# Patient Record
Sex: FEMALE | Race: WHITE | HISPANIC OR LATINO | Employment: FULL TIME | URBAN - METROPOLITAN AREA
[De-identification: names, ages, dates, MRNs, and addresses within clinical notes are randomized per-mention and may not be internally consistent; named-entity substitution may affect disease eponyms.]

---

## 2019-03-13 LAB
ABO GROUP BLD: NORMAL
RH BLD: POSITIVE
RUBV IGG SERPL IA-ACNC: 1.62

## 2019-08-12 ENCOUNTER — HOSPITAL ENCOUNTER (OUTPATIENT)
Dept: LAB | Facility: MEDICAL CENTER | Age: 24
End: 2019-08-12
Attending: OBSTETRICS & GYNECOLOGY
Payer: COMMERCIAL

## 2019-08-12 PROCEDURE — 87081 CULTURE SCREEN ONLY: CPT

## 2019-08-12 PROCEDURE — 87150 DNA/RNA AMPLIFIED PROBE: CPT

## 2019-08-14 LAB — GP B STREP DNA SPEC QL NAA+PROBE: NEGATIVE

## 2019-08-17 ENCOUNTER — HOSPITAL ENCOUNTER (OUTPATIENT)
Facility: MEDICAL CENTER | Age: 24
End: 2019-08-17
Attending: OBSTETRICS & GYNECOLOGY | Admitting: OBSTETRICS & GYNECOLOGY
Payer: COMMERCIAL

## 2019-08-17 VITALS
SYSTOLIC BLOOD PRESSURE: 121 MMHG | HEART RATE: 80 BPM | DIASTOLIC BLOOD PRESSURE: 77 MMHG | RESPIRATION RATE: 18 BRPM | WEIGHT: 240 LBS | TEMPERATURE: 97.8 F | BODY MASS INDEX: 40.97 KG/M2 | HEIGHT: 64 IN

## 2019-08-17 LAB — CRYSTALS AMN MICRO: NORMAL

## 2019-08-17 PROCEDURE — 89060 EXAM SYNOVIAL FLUID CRYSTALS: CPT

## 2019-08-17 PROCEDURE — 59025 FETAL NON-STRESS TEST: CPT

## 2019-08-17 NOTE — PROGRESS NOTES
NST Review    Alley Hartman is a 24 yo  who presented at 36w2d with complaints of leaking. Denied contractions, bleeding or LOF. Positive FM. Vital signs within normal limits. NST was performed and reviewed by myself with baseline of 130's, moderate variability and accelerations present. Tocometer quiescent. SSE without pooling. Ferning negative. SVE /-2. Stable for discharge home and follow up with Dr Dominguez as previously scheduled.     Loni Sifuentes M.D.

## 2019-08-17 NOTE — PROGRESS NOTES
"0435- pt is a , 36.2 weeks gestation IUP, with c/o possible SROM around 0115 after waking up in a \"puddle of fluid\", with no other leaking since, and back pain. No c/o bleeding or dfm. efm and toco placed, vss.   0455- sterile vaginal speculum exam performed, no pooling noted, fern gathered. sve performed, /2.   555- Dr. Sifuentes at nurses station, updated c/o and negative fern, received orders for discharge home on  labor precautions.  615- discussed poc with pt, monitors removed, left to change clothes.  624- addressed discharge instruction with  labor precautions, all questions answered. Left off floor with SO at side.   "

## 2019-09-11 ENCOUNTER — ANESTHESIA EVENT (OUTPATIENT)
Dept: ANESTHESIOLOGY | Facility: MEDICAL CENTER | Age: 24
End: 2019-09-11
Payer: COMMERCIAL

## 2019-09-11 ENCOUNTER — HOSPITAL ENCOUNTER (INPATIENT)
Facility: MEDICAL CENTER | Age: 24
LOS: 3 days | End: 2019-09-14
Attending: OBSTETRICS & GYNECOLOGY | Admitting: OBSTETRICS & GYNECOLOGY
Payer: COMMERCIAL

## 2019-09-11 ENCOUNTER — ANESTHESIA (OUTPATIENT)
Dept: ANESTHESIOLOGY | Facility: MEDICAL CENTER | Age: 24
End: 2019-09-11
Payer: COMMERCIAL

## 2019-09-11 ENCOUNTER — APPOINTMENT (OUTPATIENT)
Dept: OBGYN | Facility: MEDICAL CENTER | Age: 24
End: 2019-09-11
Attending: OBSTETRICS & GYNECOLOGY
Payer: COMMERCIAL

## 2019-09-11 LAB
ALBUMIN SERPL BCP-MCNC: 3.5 G/DL (ref 3.2–4.9)
ALBUMIN/GLOB SERPL: 1.3 G/DL
ALP SERPL-CCNC: 158 U/L (ref 30–99)
ALT SERPL-CCNC: 10 U/L (ref 2–50)
ANION GAP SERPL CALC-SCNC: 11 MMOL/L (ref 0–11.9)
AST SERPL-CCNC: 15 U/L (ref 12–45)
BASOPHILS # BLD AUTO: 0.1 % (ref 0–1.8)
BASOPHILS # BLD: 0.01 K/UL (ref 0–0.12)
BILIRUB SERPL-MCNC: 0.8 MG/DL (ref 0.1–1.5)
BUN SERPL-MCNC: 9 MG/DL (ref 8–22)
CALCIUM SERPL-MCNC: 9.3 MG/DL (ref 8.5–10.5)
CHLORIDE SERPL-SCNC: 104 MMOL/L (ref 96–112)
CO2 SERPL-SCNC: 21 MMOL/L (ref 20–33)
CREAT SERPL-MCNC: 0.56 MG/DL (ref 0.5–1.4)
EOSINOPHIL # BLD AUTO: 0.06 K/UL (ref 0–0.51)
EOSINOPHIL NFR BLD: 0.8 % (ref 0–6.9)
ERYTHROCYTE [DISTWIDTH] IN BLOOD BY AUTOMATED COUNT: 45.6 FL (ref 35.9–50)
GLOBULIN SER CALC-MCNC: 2.8 G/DL (ref 1.9–3.5)
GLUCOSE SERPL-MCNC: 110 MG/DL (ref 65–99)
HCT VFR BLD AUTO: 40.1 % (ref 37–47)
HGB BLD-MCNC: 13.8 G/DL (ref 12–16)
HOLDING TUBE BB 8507: NORMAL
IMM GRANULOCYTES # BLD AUTO: 0.03 K/UL (ref 0–0.11)
IMM GRANULOCYTES NFR BLD AUTO: 0.4 % (ref 0–0.9)
LYMPHOCYTES # BLD AUTO: 1.55 K/UL (ref 1–4.8)
LYMPHOCYTES NFR BLD: 19.7 % (ref 22–41)
MCH RBC QN AUTO: 32 PG (ref 27–33)
MCHC RBC AUTO-ENTMCNC: 34.4 G/DL (ref 33.6–35)
MCV RBC AUTO: 93 FL (ref 81.4–97.8)
MONOCYTES # BLD AUTO: 0.37 K/UL (ref 0–0.85)
MONOCYTES NFR BLD AUTO: 4.7 % (ref 0–13.4)
NEUTROPHILS # BLD AUTO: 5.86 K/UL (ref 2–7.15)
NEUTROPHILS NFR BLD: 74.3 % (ref 44–72)
NRBC # BLD AUTO: 0 K/UL
NRBC BLD-RTO: 0 /100 WBC
PLATELET # BLD AUTO: 186 K/UL (ref 164–446)
PMV BLD AUTO: 12.3 FL (ref 9–12.9)
POTASSIUM SERPL-SCNC: 3.7 MMOL/L (ref 3.6–5.5)
PROT SERPL-MCNC: 6.3 G/DL (ref 6–8.2)
RBC # BLD AUTO: 4.31 M/UL (ref 4.2–5.4)
SODIUM SERPL-SCNC: 136 MMOL/L (ref 135–145)
URATE SERPL-MCNC: 5.4 MG/DL (ref 1.9–8.2)
WBC # BLD AUTO: 7.9 K/UL (ref 4.8–10.8)

## 2019-09-11 PROCEDURE — 85025 COMPLETE CBC W/AUTO DIFF WBC: CPT

## 2019-09-11 PROCEDURE — 10H07YZ INSERTION OF OTHER DEVICE INTO PRODUCTS OF CONCEPTION, VIA NATURAL OR ARTIFICIAL OPENING: ICD-10-PCS | Performed by: OBSTETRICS & GYNECOLOGY

## 2019-09-11 PROCEDURE — 3E033VJ INTRODUCTION OF OTHER HORMONE INTO PERIPHERAL VEIN, PERCUTANEOUS APPROACH: ICD-10-PCS | Performed by: OBSTETRICS & GYNECOLOGY

## 2019-09-11 PROCEDURE — 700105 HCHG RX REV CODE 258

## 2019-09-11 PROCEDURE — 770002 HCHG ROOM/CARE - OB PRIVATE (112)

## 2019-09-11 PROCEDURE — 36415 COLL VENOUS BLD VENIPUNCTURE: CPT

## 2019-09-11 PROCEDURE — 700105 HCHG RX REV CODE 258: Performed by: OBSTETRICS & GYNECOLOGY

## 2019-09-11 PROCEDURE — 700111 HCHG RX REV CODE 636 W/ 250 OVERRIDE (IP)

## 2019-09-11 PROCEDURE — 700111 HCHG RX REV CODE 636 W/ 250 OVERRIDE (IP): Performed by: OBSTETRICS & GYNECOLOGY

## 2019-09-11 PROCEDURE — 700111 HCHG RX REV CODE 636 W/ 250 OVERRIDE (IP): Performed by: ANESTHESIOLOGY

## 2019-09-11 PROCEDURE — 80053 COMPREHEN METABOLIC PANEL: CPT

## 2019-09-11 PROCEDURE — 10907ZC DRAINAGE OF AMNIOTIC FLUID, THERAPEUTIC FROM PRODUCTS OF CONCEPTION, VIA NATURAL OR ARTIFICIAL OPENING: ICD-10-PCS | Performed by: OBSTETRICS & GYNECOLOGY

## 2019-09-11 PROCEDURE — 84550 ASSAY OF BLOOD/URIC ACID: CPT

## 2019-09-11 RX ORDER — BUPIVACAINE HYDROCHLORIDE 2.5 MG/ML
INJECTION, SOLUTION EPIDURAL; INFILTRATION; INTRACAUDAL
Status: COMPLETED
Start: 2019-09-11 | End: 2019-09-11

## 2019-09-11 RX ORDER — BUPIVACAINE HYDROCHLORIDE 2.5 MG/ML
INJECTION, SOLUTION EPIDURAL; INFILTRATION; INTRACAUDAL PRN
Status: DISCONTINUED | OUTPATIENT
Start: 2019-09-11 | End: 2019-09-12 | Stop reason: SURG

## 2019-09-11 RX ORDER — SODIUM CHLORIDE, SODIUM LACTATE, POTASSIUM CHLORIDE, CALCIUM CHLORIDE 600; 310; 30; 20 MG/100ML; MG/100ML; MG/100ML; MG/100ML
INJECTION, SOLUTION INTRAVENOUS
Status: COMPLETED
Start: 2019-09-11 | End: 2019-09-11

## 2019-09-11 RX ORDER — SODIUM CHLORIDE, SODIUM LACTATE, POTASSIUM CHLORIDE, CALCIUM CHLORIDE 600; 310; 30; 20 MG/100ML; MG/100ML; MG/100ML; MG/100ML
INJECTION, SOLUTION INTRAVENOUS CONTINUOUS
Status: DISPENSED | OUTPATIENT
Start: 2019-09-11 | End: 2019-09-11

## 2019-09-11 RX ORDER — ROPIVACAINE HYDROCHLORIDE 2 MG/ML
INJECTION, SOLUTION EPIDURAL; INFILTRATION; PERINEURAL
Status: COMPLETED
Start: 2019-09-11 | End: 2019-09-11

## 2019-09-11 RX ORDER — DEXTROSE, SODIUM CHLORIDE, SODIUM LACTATE, POTASSIUM CHLORIDE, AND CALCIUM CHLORIDE 5; .6; .31; .03; .02 G/100ML; G/100ML; G/100ML; G/100ML; G/100ML
INJECTION, SOLUTION INTRAVENOUS CONTINUOUS
Status: DISCONTINUED | OUTPATIENT
Start: 2019-09-11 | End: 2019-09-14 | Stop reason: HOSPADM

## 2019-09-11 RX ORDER — ROPIVACAINE HYDROCHLORIDE 2 MG/ML
INJECTION, SOLUTION EPIDURAL; INFILTRATION
Status: DISCONTINUED | OUTPATIENT
Start: 2019-09-11 | End: 2019-09-12 | Stop reason: SURG

## 2019-09-11 RX ORDER — MISOPROSTOL 200 UG/1
800 TABLET ORAL
Status: DISCONTINUED | OUTPATIENT
Start: 2019-09-11 | End: 2019-09-12 | Stop reason: HOSPADM

## 2019-09-11 RX ADMIN — ROPIVACAINE HYDROCHLORIDE 10 ML/HR: 2 INJECTION, SOLUTION EPIDURAL; INFILTRATION at 21:34

## 2019-09-11 RX ADMIN — ROPIVACAINE HYDROCHLORIDE 10 ML: 2 INJECTION, SOLUTION EPIDURAL; INFILTRATION at 21:56

## 2019-09-11 RX ADMIN — SODIUM CHLORIDE, POTASSIUM CHLORIDE, SODIUM LACTATE AND CALCIUM CHLORIDE 1000 ML: 600; 310; 30; 20 INJECTION, SOLUTION INTRAVENOUS at 13:00

## 2019-09-11 RX ADMIN — FENTANYL CITRATE 100 MCG: 50 INJECTION, SOLUTION INTRAMUSCULAR; INTRAVENOUS at 21:34

## 2019-09-11 RX ADMIN — BUPIVACAINE HYDROCHLORIDE 10 ML: 2.5 INJECTION, SOLUTION EPIDURAL; INFILTRATION; INTRACAUDAL; PERINEURAL at 21:34

## 2019-09-11 RX ADMIN — SODIUM CHLORIDE, POTASSIUM CHLORIDE, SODIUM LACTATE AND CALCIUM CHLORIDE: 600; 310; 30; 20 INJECTION, SOLUTION INTRAVENOUS at 22:59

## 2019-09-11 RX ADMIN — SODIUM CHLORIDE, POTASSIUM CHLORIDE, SODIUM LACTATE AND CALCIUM CHLORIDE: 600; 310; 30; 20 INJECTION, SOLUTION INTRAVENOUS at 22:12

## 2019-09-11 RX ADMIN — Medication 1 MILLI-UNITS/MIN: at 14:47

## 2019-09-11 ASSESSMENT — LIFESTYLE VARIABLES
EVER_SMOKED: NEVER
HAVE PEOPLE ANNOYED YOU BY CRITICIZING YOUR DRINKING: NO
ON A TYPICAL DAY WHEN YOU DRINK ALCOHOL HOW MANY DRINKS DO YOU HAVE: 0
DOES PATIENT WANT TO STOP DRINKING: NO
EVER HAD A DRINK FIRST THING IN THE MORNING TO STEADY YOUR NERVES TO GET RID OF A HANGOVER: NO
TOTAL SCORE: 0
TOTAL SCORE: 0
ALCOHOL_USE: NO
AVERAGE NUMBER OF DAYS PER WEEK YOU HAVE A DRINK CONTAINING ALCOHOL: 0
EVER FELT BAD OR GUILTY ABOUT YOUR DRINKING: NO
HAVE YOU EVER FELT YOU SHOULD CUT DOWN ON YOUR DRINKING: NO
CONSUMPTION TOTAL: NEGATIVE
TOTAL SCORE: 0
EVER_SMOKED: NEVER
HOW MANY TIMES IN THE PAST YEAR HAVE YOU HAD 5 OR MORE DRINKS IN A DAY: 0

## 2019-09-11 ASSESSMENT — PATIENT HEALTH QUESTIONNAIRE - PHQ9
SUM OF ALL RESPONSES TO PHQ9 QUESTIONS 1 AND 2: 0
SUM OF ALL RESPONSES TO PHQ9 QUESTIONS 1 AND 2: 0
1. LITTLE INTEREST OR PLEASURE IN DOING THINGS: NOT AT ALL
2. FEELING DOWN, DEPRESSED, IRRITABLE, OR HOPELESS: NOT AT ALL
1. LITTLE INTEREST OR PLEASURE IN DOING THINGS: NOT AT ALL
2. FEELING DOWN, DEPRESSED, IRRITABLE, OR HOPELESS: NOT AT ALL

## 2019-09-11 NOTE — H&P
DATE OF ADMISSION:  2019    CHIEF COMPLAINT:  Induction of labor secondary to elevated blood pressures in   our office.    HISTORY OF PRESENT ILLNESS:  The patient is a 24-year-old female  1,   para 0 at 39-6/7 weeks' gestational age.  She presented for an induction of   labor this morning secondary to having a blood pressure of 144/88 in the   office yesterday with 2+ protein.  Her pregnancy has thus far been   uncomplicated.    PAST MEDICAL HISTORY:  Negative.    PAST SURGICAL HISTORY:  Negative.    SOCIAL HISTORY:  Negative.    ALLERGIES:  Patient has no known drug medication allergies.      CURRENT MEDICATIONS:  Include a prenatal vitamin.    LABORATORY DATA:  Show blood type of O positive, rubella immune.  Her group B   strep culture is negative.    PHYSICAL EXAMINATION:  VITAL SIGNS:  Her vital signs thus far have been stable at admission, with   first of 112/59.  CARDIOVASCULAR:  Regular rate and rhythm.  LUNGS:  Clear.  VAGINAL EXAM:  3, 80 and -2.  Fetal heart tones are 120s, category 1 tracing   and tocometry is irregular at this point.    LABORATORY DATA:  Have been obtained, but are not back yet.    ASSESSMENT AND PLAN:  This is a 24-year-old female  1, para 0 at 39-6/7   weeks' gestational age.  She presents for induction of labor secondary to an   elevated blood pressure in the office.  She has been started on Pitocin.  She   can have fentanyl or an epidural as needed.       ____________________________________     Corinne E. Capurro, MD [facility] / [facility]    DD:  2019 15:39:59  DT:  2019 15:54:05    D#:  [id number]  Job#:  [id number]

## 2019-09-12 LAB
ERYTHROCYTE [DISTWIDTH] IN BLOOD BY AUTOMATED COUNT: 46.6 FL (ref 35.9–50)
HCT VFR BLD AUTO: 38.1 % (ref 37–47)
HGB BLD-MCNC: 12.9 G/DL (ref 12–16)
MCH RBC QN AUTO: 31.6 PG (ref 27–33)
MCHC RBC AUTO-ENTMCNC: 33.9 G/DL (ref 33.6–35)
MCV RBC AUTO: 93.4 FL (ref 81.4–97.8)
PLATELET # BLD AUTO: 163 K/UL (ref 164–446)
PMV BLD AUTO: 11.8 FL (ref 9–12.9)
RBC # BLD AUTO: 4.08 M/UL (ref 4.2–5.4)
WBC # BLD AUTO: 16 K/UL (ref 4.8–10.8)

## 2019-09-12 PROCEDURE — 304965 HCHG RECOVERY SERVICES

## 2019-09-12 PROCEDURE — A9270 NON-COVERED ITEM OR SERVICE: HCPCS | Performed by: OBSTETRICS & GYNECOLOGY

## 2019-09-12 PROCEDURE — 700111 HCHG RX REV CODE 636 W/ 250 OVERRIDE (IP)

## 2019-09-12 PROCEDURE — 700102 HCHG RX REV CODE 250 W/ 637 OVERRIDE(OP): Performed by: OBSTETRICS & GYNECOLOGY

## 2019-09-12 PROCEDURE — 3E02340 INTRODUCTION OF INFLUENZA VACCINE INTO MUSCLE, PERCUTANEOUS APPROACH: ICD-10-PCS | Performed by: OBSTETRICS & GYNECOLOGY

## 2019-09-12 PROCEDURE — 700111 HCHG RX REV CODE 636 W/ 250 OVERRIDE (IP): Performed by: OBSTETRICS & GYNECOLOGY

## 2019-09-12 PROCEDURE — 85027 COMPLETE CBC AUTOMATED: CPT

## 2019-09-12 PROCEDURE — 36415 COLL VENOUS BLD VENIPUNCTURE: CPT

## 2019-09-12 PROCEDURE — 59409 OBSTETRICAL CARE: CPT

## 2019-09-12 PROCEDURE — 770002 HCHG ROOM/CARE - OB PRIVATE (112)

## 2019-09-12 PROCEDURE — 90686 IIV4 VACC NO PRSV 0.5 ML IM: CPT | Performed by: OBSTETRICS & GYNECOLOGY

## 2019-09-12 PROCEDURE — 303615 HCHG EPIDURAL/SPINAL ANESTHESIA FOR LABOR

## 2019-09-12 PROCEDURE — 700105 HCHG RX REV CODE 258

## 2019-09-12 PROCEDURE — 90471 IMMUNIZATION ADMIN: CPT

## 2019-09-12 RX ORDER — IBUPROFEN 600 MG/1
600 TABLET ORAL EVERY 6 HOURS PRN
Status: DISCONTINUED | OUTPATIENT
Start: 2019-09-12 | End: 2019-09-14 | Stop reason: HOSPADM

## 2019-09-12 RX ORDER — ACETAMINOPHEN 325 MG/1
325 TABLET ORAL EVERY 4 HOURS PRN
Status: DISCONTINUED | OUTPATIENT
Start: 2019-09-12 | End: 2019-09-14 | Stop reason: HOSPADM

## 2019-09-12 RX ORDER — ONDANSETRON 2 MG/ML
INJECTION INTRAMUSCULAR; INTRAVENOUS
Status: COMPLETED
Start: 2019-09-12 | End: 2019-09-12

## 2019-09-12 RX ORDER — ONDANSETRON 2 MG/ML
4 INJECTION INTRAMUSCULAR; INTRAVENOUS EVERY 6 HOURS PRN
Status: DISCONTINUED | OUTPATIENT
Start: 2019-09-12 | End: 2019-09-14 | Stop reason: HOSPADM

## 2019-09-12 RX ORDER — ROPIVACAINE HYDROCHLORIDE 2 MG/ML
INJECTION, SOLUTION EPIDURAL; INFILTRATION; PERINEURAL
Status: COMPLETED
Start: 2019-09-12 | End: 2019-09-12

## 2019-09-12 RX ORDER — MISOPROSTOL 200 UG/1
600 TABLET ORAL
Status: DISCONTINUED | OUTPATIENT
Start: 2019-09-12 | End: 2019-09-14 | Stop reason: HOSPADM

## 2019-09-12 RX ORDER — SODIUM CHLORIDE, SODIUM LACTATE, POTASSIUM CHLORIDE, CALCIUM CHLORIDE 600; 310; 30; 20 MG/100ML; MG/100ML; MG/100ML; MG/100ML
INJECTION, SOLUTION INTRAVENOUS
Status: COMPLETED
Start: 2019-09-12 | End: 2019-09-12

## 2019-09-12 RX ORDER — ACETAMINOPHEN 325 MG/1
650 TABLET ORAL EVERY 4 HOURS PRN
Status: DISCONTINUED | OUTPATIENT
Start: 2019-09-12 | End: 2019-09-14 | Stop reason: HOSPADM

## 2019-09-12 RX ORDER — DOCUSATE SODIUM 100 MG/1
100 CAPSULE, LIQUID FILLED ORAL 2 TIMES DAILY PRN
Status: DISCONTINUED | OUTPATIENT
Start: 2019-09-12 | End: 2019-09-14 | Stop reason: HOSPADM

## 2019-09-12 RX ORDER — ONDANSETRON 4 MG/1
4 TABLET, ORALLY DISINTEGRATING ORAL EVERY 6 HOURS PRN
Status: DISCONTINUED | OUTPATIENT
Start: 2019-09-12 | End: 2019-09-14 | Stop reason: HOSPADM

## 2019-09-12 RX ORDER — SODIUM CHLORIDE, SODIUM LACTATE, POTASSIUM CHLORIDE, CALCIUM CHLORIDE 600; 310; 30; 20 MG/100ML; MG/100ML; MG/100ML; MG/100ML
INJECTION, SOLUTION INTRAVENOUS PRN
Status: DISCONTINUED | OUTPATIENT
Start: 2019-09-12 | End: 2019-09-14 | Stop reason: HOSPADM

## 2019-09-12 RX ADMIN — SODIUM CHLORIDE, POTASSIUM CHLORIDE, SODIUM LACTATE AND CALCIUM CHLORIDE 1000 ML: 600; 310; 30; 20 INJECTION, SOLUTION INTRAVENOUS at 07:00

## 2019-09-12 RX ADMIN — IBUPROFEN 600 MG: 600 TABLET ORAL at 22:05

## 2019-09-12 RX ADMIN — Medication 125 ML/HR: at 08:09

## 2019-09-12 RX ADMIN — ROPIVACAINE HYDROCHLORIDE 200 MG: 2 INJECTION, SOLUTION EPIDURAL; INFILTRATION at 06:48

## 2019-09-12 RX ADMIN — INFLUENZA A VIRUS A/BRISBANE/02/2018 IVR-190 (H1N1) ANTIGEN (FORMALDEHYDE INACTIVATED), INFLUENZA A VIRUS A/KANSAS/14/2017 X-327 (H3N2) ANTIGEN (FORMALDEHYDE INACTIVATED), INFLUENZA B VIRUS B/PHUKET/3073/2013 ANTIGEN (FORMALDEHYDE INACTIVATED), AND INFLUENZA B VIRUS B/MARYLAND/15/2016 BX-69A ANTIGEN (FORMALDEHYDE INACTIVATED) 0.5 ML: 15; 15; 15; 15 INJECTION, SUSPENSION INTRAMUSCULAR at 18:15

## 2019-09-12 RX ADMIN — IBUPROFEN 600 MG: 600 TABLET ORAL at 08:11

## 2019-09-12 RX ADMIN — ONDANSETRON 4 MG: 2 INJECTION INTRAMUSCULAR; INTRAVENOUS at 05:10

## 2019-09-12 RX ADMIN — IBUPROFEN 600 MG: 600 TABLET ORAL at 15:10

## 2019-09-12 RX ADMIN — Medication 2000 ML/HR: at 07:26

## 2019-09-12 NOTE — ANESTHESIA QCDR
2019 [facility] Clinical Data Registry (for Quality Improvement)     Postoperative nausea/vomiting risk protocol (Adult = 18 yrs and Pediatric 3-17 yrs)- (430 and 463)  General inhalation anesthetic (NOT TIVA) with PONV risk factors: No  Provision of anti-emetic therapy with at least 2 different classes of agents: N/A  Patient DID NOT receive anti-emetic therapy and reason is documented in Medical Record: N/A    Multimodal Pain Management- (AQI59)  Patient undergoing Elective Surgery (i.e. Outpatient, or ASC, or Prescheduled Surgery prior to Hospital Admission): No  Use of Multimodal Pain Management, two or more drugs and/or interventions, NOT including systemic opioids: N/A  Exception: Documented allergy to multiple classes of analgesics: N/A    PACU assessment of acute postoperative pain prior to Anesthesia Care End- Applies to Patients Age = 18- (ABG7)  Initial PACU pain score is which of the following: < 7/10  Patient unable to report pain score: N/A    Post-anesthetic transfer of care checklist/protocol to PACU/ICU- (426 and 427)  Upon conclusion of case, patient transferred to which of the following locations: PACU/Non-ICU  Use of transfer checklist/protocol: Yes  Exclusion: Service Performed in Patient Hospital Room (and thus did not require transfer): N/A    PACU Reintubation- (AQI31)  General anesthesia requiring endotracheal intubation (ETT) along with subsequent extubation in OR or PACU: No  Required reintubation in the PACU: N/A  Extubation was a planned trial documented in the medical record prior to removal of the original airway device: N/A    Unplanned admission to ICU related to anesthesia service up through end of PACU care- (MD51)  Unplanned admission to ICU (not initially anticipated at anesthesia start time): No

## 2019-09-12 NOTE — ANESTHESIA PROCEDURE NOTES
Epidural Block  Date/Time: 9/11/2019 9:34 PM  Performed by: Munir Noyola M.D.  Authorized by: Munir Noyola M.D.     Patient Location:  OB  Start Time:  9/11/2019 9:34 PM  End Time:  9/11/2019 9:55 PM  Reason for Block: labor analgesia    patient identified, IV checked, site marked, risks and benefits discussed, surgical consent, monitors and equipment checked, pre-op evaluation and timeout performed    Patient Position:  Sitting  Prep: ChloraPrep, patient draped and sterile technique    Monitoring:  Blood pressure, continuous pulse oximetry and heart rate  Approach:  Midline  Location:  L2-L3  Injection Technique:  ROBERT air  Skin infiltration:  Lidocaine  Strength:  1%  Dose:  3ml  Needle Type:  Tuohy  Needle Gauge:  17 G  Needle Length:  3.5 in  Loss of resistance::  9  Catheter Size:  19 G  Catheter at Skin Depth:  19  Test Dose:  Lidocaine 1.5% with epinephrine 1-to-200,000  Test Dose Result:  Negative

## 2019-09-12 NOTE — LACTATION NOTE
This note was copied from a baby's chart.  Reviewed frequency/duration of feeds and importance of skin to skin contact. Taught hand expression and colostrum easily expressed. Mother reports nipples are feeling sore from initial feedings, assisted to deepen latch and mother reports improved comfort. Encouraged to request RN assistance as needed with deep latch. LC to follow as needed. See flow sheet for more details.

## 2019-09-12 NOTE — PROGRESS NOTES
S) pt comfortable with epidural  O) vss  VE: 6/80/0  Fht;s 130 Cat1  [place]: q2-4  A/P Term iup   continue pit

## 2019-09-12 NOTE — L&D DELIVERY NOTE
DATE OF SERVICE:  09/12/2019    This is a normal spontaneous vaginal delivery of a viable female infant over   midline episiotomy with epidural placement.  Spontaneous vaginal delivery of   the head in controlled sterile fashion.  No nuchal cord was noted.  The rest   of infant delivered through uncomplicated after coming thick green meconium.    Cord was clamped x2 and cut and infant was handed off to waiting nursing   staff.  Respiratory technicians were eventually called.  Fundus was firm with   Pitocin and massage.  Midline episiotomy was approximately second-degree and   repaired using 3-0 Vicryl.  Total estimated blood loss was approximately 200   mL.  Apgars have not been assigned to the infant, as the respiratory   technicians are working with the baby.       ____________________________________     Corinne E. Capurro, MD [facility] / NTS    DD:  09/12/2019 07:34:59  DT:  09/12/2019 07:42:26    D#:  [id number]  Job#:  [id number]

## 2019-09-12 NOTE — PROGRESS NOTES
0510: Report received from SEBASTIEN Lo, POC discussed with patient, all questions and concerns addressed. Began pushing with patient at this time.     0715: Dr. Dominguez notified to come to bedside at this time for delivery.     0720: Infant delivered by Dr. Dominguez weighting 8 lbs 10 ounces, with 8/9 Apgars. 2nd degree episiotomy noted with repair, EBL to be 200 mL.     0811: Pt having mild cramping, rating pain a 4 using a pain scale 0 to 10, patient medicated, see MAR.     0915: Report given to SEBASTIEN Jay RN.

## 2019-09-12 NOTE — PROGRESS NOTES
1900- Report received from LINDA Dsouza. Pt resting comfortably in bed with family at side. Pitocin running at 9mili-units /min. VSS. POC discussed and assumed.   1920- Dr Dominguez at bedside to assess pt. AROM clear fluid. IUPC placed.   1945- Pain management discussed. Pt more uncomfortable with contractions. Pt is still unsure about epidural and will call for RN when she had decided.   2039- Pt would like an epidural. sve 4/80/-2. LR bolus started.   2100- Pt care taken over by LINDA Chahal.   2245- Pt care reassumed. Pt is comfortable with an epidural and pt positioned on peanut ball.   0136- Dr Dominguez at bedside to assess pt. SVE 6/90/0. Pt repositioned on peanut ball on far left side.   0355- Pt feeling increase in pressure and pain. SVE lip/100/0. Pt repositioned on left side with peanut ball.   0450- SVE complete/+2. Dr Doimnguez made aware and orders to begin pushing with pt.  0500-Pt positioned in stirups and pushing education given. Family at side for support.   0510- Pt with c/o n/v. IV Zofran given and report given to LINDA Oconnell.

## 2019-09-12 NOTE — PROGRESS NOTES
2125-Dr Noyola at bedside. Epidural dicussed with pt. Pt denies any questions and desires epidural. Consents signed. Pt positioned for epidural. Position changed multiple times for placement.  2140-Pt repositioned to side lying.   2154- Epidural placed. Test dose given. US monitor adjusted.   2212-Pt reports no pain with epidural. Feeling some pressure but comfortable. Saeed cath placed. Pt repositioned for comfort  2245-Update given to Herminia MCKEON

## 2019-09-12 NOTE — ANESTHESIA TIME REPORT
Anesthesia Start and Stop Event Times     Date Time Event    9/11/2019 2123 Ready for Procedure     2126 Anesthesia Start    9/12/2019 0720 Anesthesia Stop        Responsible Staff  09/11/19 to 09/12/19    Name Role Begin End    Munir Noyola M.D. Anesth 2126 0708    Marc Hurtado M.D. Anesth 0708 0720        Preop Diagnosis (Free Text):  Pre-op Diagnosis             Preop Diagnosis (Codes):    Post op Diagnosis  Intrauterine pregnancy      Premium Reason  Non-Premium    Comments:

## 2019-09-12 NOTE — PROGRESS NOTES
1224: Pt presents to L&D for scheduled IOL. Pt is a  at 39.6 weeks gestation, POC discussed, pt verbalized understanding.

## 2019-09-12 NOTE — ANESTHESIA POSTPROCEDURE EVALUATION
Patient: Alley Lea    Procedure Summary     Date:  09/11/19 Room / Location:      Anesthesia Start:  2126 Anesthesia Stop:  09/12/19 0720    Procedure:  Labor Epidural Diagnosis:      Scheduled Providers:   Responsible Provider:  Marc Hurtado M.D.    Anesthesia Type:  epidural ASA Status:  2          Final Anesthesia Type: epidural  Last vitals  BP   Blood Pressure: 126/62    Temp   36.4 °C (97.6 °F)    Pulse   Pulse: 80   Resp   16    SpO2   93 %      Anesthesia Post Evaluation    Patient location during evaluation: PACU  Patient participation: complete - patient participated  Level of consciousness: awake and alert    Airway patency: patent  Anesthetic complications: no  Cardiovascular status: hemodynamically stable  Respiratory status: acceptable  Hydration status: euvolemic    PONV: none           Nurse Pain Score: 4 (NPRS)

## 2019-09-12 NOTE — PROGRESS NOTES
Report received from L&D RN. Pt arrived to the floor via wheelchair with RN at 0950. Arm bands verified. Assumed care.  A/O x4. VSS. Responds appropriately. Denies pain, SOB. Fundus firm, lochia light. Assessment complete. Discussed POC, pain control,  care, breastfeeding, skin to skin, safety, DC planning, pt verbalizes understanding.  Call light and belongings within reach.  Bed in the lowest position. Treaded socks in place. Hourly rounding in progress. Will continue to monitor .

## 2019-09-12 NOTE — PROGRESS NOTES
PN  S) pt doesn't feel uc's yet  O) vss  VE: 4/80/-2  Fht's: 130's Cat 1  uc's q4min  A/P IUP term   arom - clear, iupc placed

## 2019-09-13 PROCEDURE — A9270 NON-COVERED ITEM OR SERVICE: HCPCS | Performed by: OBSTETRICS & GYNECOLOGY

## 2019-09-13 PROCEDURE — 770002 HCHG ROOM/CARE - OB PRIVATE (112)

## 2019-09-13 PROCEDURE — 700102 HCHG RX REV CODE 250 W/ 637 OVERRIDE(OP): Performed by: OBSTETRICS & GYNECOLOGY

## 2019-09-13 RX ORDER — IBUPROFEN 600 MG/1
600 TABLET ORAL EVERY 6 HOURS PRN
Qty: 30 TAB | Refills: 1 | Status: ON HOLD | OUTPATIENT
Start: 2019-09-13 | End: 2020-12-12

## 2019-09-13 RX ADMIN — IBUPROFEN 600 MG: 600 TABLET ORAL at 14:50

## 2019-09-13 ASSESSMENT — EDINBURGH POSTNATAL DEPRESSION SCALE (EPDS)
I HAVE FELT SCARED OR PANICKY FOR NO GOOD REASON: NO, NOT AT ALL
I HAVE BLAMED MYSELF UNNECESSARILY WHEN THINGS WENT WRONG: NOT VERY OFTEN
I HAVE FELT SAD OR MISERABLE: NOT VERY OFTEN
I HAVE LOOKED FORWARD WITH ENJOYMENT TO THINGS: AS MUCH AS I EVER DID
THE THOUGHT OF HARMING MYSELF HAS OCCURRED TO ME: NEVER
I HAVE BEEN SO UNHAPPY THAT I HAVE HAD DIFFICULTY SLEEPING: NOT AT ALL
I HAVE BEEN SO UNHAPPY THAT I HAVE BEEN CRYING: NO, NEVER
THINGS HAVE BEEN GETTING ON TOP OF ME: NO, I HAVE BEEN COPING AS WELL AS EVER
I HAVE BEEN ABLE TO LAUGH AND SEE THE FUNNY SIDE OF THINGS: AS MUCH AS I ALWAYS COULD
I HAVE BEEN ANXIOUS OR WORRIED FOR NO GOOD REASON: NO, NOT AT ALL

## 2019-09-13 NOTE — CARE PLAN
Problem: Pain  Goal: Alleviation of Pain or a reduction in pain to the patient's comfort goal  Outcome: PROGRESSING AS EXPECTED     Problem: Altered physiologic condition related to immediate post-delivery state and potential for bleeding/hemorrhage  Goal: Patient physiologically stable as evidenced by normal lochia, palpable uterine involution and vital signs within normal limits  Outcome: PROGRESSING AS EXPECTED

## 2019-09-13 NOTE — DISCHARGE SUMMARY
Discharge Summary:      Alley Lea    Admit Date:   2019  Discharge Date:  2019     Admitting diagnosis:  Pregnancy  Labor and delivery indication for care or intervention  Discharge Diagnosis: Status post vaginal, spontaneous.  Pregnancy Complications: none  Tubal Ligation:  no        History:  Past Medical History:   Diagnosis Date   • Known health problems: none      OB History    Para Term  AB Living   1             SAB TAB Ectopic Molar Multiple Live Births                    # Outcome Date GA Lbr Michael/2nd Weight Sex Delivery Anes PTL Lv   1 Current                 Banana  There are no active problems to display for this patient.       Hospital Course:   24 y.o. , now para 1, was admitted with the above mentioned diagnosis, underwent Induction of Labor, vaginal, spontaneous. Patient postpartum course was unremarkable, with progressive advancement in diet , ambulation and toleration of oral analgesia. Patient without complaints today and desires discharge.      Vitals:    19 0601 19 1000 19 1800 19 0600   BP:  126/62 101/55 121/69   Pulse:  80 69 72   Resp:  16 16 16   Temp: 37.3 °C (99.2 °F) 36.4 °C (97.6 °F) 36.8 °C (98.2 °F) 36.4 °C (97.5 °F)   TempSrc: Oral Temporal Temporal Temporal   SpO2:  93% 91% 94%   Weight:       Height:           Current Facility-Administered Medications   Medication Dose   • ondansetron (ZOFRAN ODT) dispertab 4 mg  4 mg    Or   • ondansetron (ZOFRAN) syringe/vial injection 4 mg  4 mg   • oxytocin (PITOCIN) infusion (for postpartum)   mL/hr   • ibuprofen (MOTRIN) tablet 600 mg  600 mg   • acetaminophen (TYLENOL) tablet 650 mg  650 mg   • acetaminophen (TYLENOL) tablet 325 mg  325 mg   • LR infusion     • miSOPROStol (CYTOTEC) tablet 600 mcg  600 mcg   • docusate sodium (COLACE) capsule 100 mg  100 mg   • D5LR infusion     • oxytocin (PITOCIN) infusion (for induction)  0.5-20 amberly-units/min       Exam:  Breast  Exam: negative  Abdomen: Abdomen soft, non-tender. BS normal. No masses,  No organomegaly  Fundus Non Tender: yes  Incision: none  Perineum: perineum intact  Extremity: extremities, peripheral pulses and reflexes normal     Labs:  Recent Labs     09/11/19  1255 09/12/19  1926   WBC 7.9 16.0*   RBC 4.31 4.08*   HEMOGLOBIN 13.8 12.9   HEMATOCRIT 40.1 38.1   MCV 93.0 93.4   MCH 32.0 31.6   MCHC 34.4 33.9   RDW 45.6 46.6   PLATELETCT 186 163*   MPV 12.3 11.8        Activity:   Discharge to home  Pelvic Rest x 6 weeks    Assessment:  normal postpartum course  Discharge Assessment: No areas of skin breakdown/redness; surgical incision intact/healing     Follow up: .6 weeks Capurro      Discharge Meds:   Current Outpatient Medications   Medication Sig Dispense Refill   • ibuprofen (MOTRIN) 600 MG Tab Take 1 Tab by mouth every 6 hours as needed (For cramping after delivery; do not give if patient is receiving ketorolac (Toradol)). 30 Tab 1       Iveth Rand M.D.

## 2019-09-13 NOTE — LACTATION NOTE
"Met with MOB for a lactation follow up visit.  MOB reported has pain at both nipples with latch.  Breast assessment performed and MOB has scabbing at the left nipple and minor bruising at the right nipple.  MOB stated she is applying Lanolin cream to both breasts to promote healing, but she does not \"think it (Lanolin cream) is working.\"  MOB spoke with tears coming to her eyes.    Offered MOB the opportunity to pump and supplement infant with formula (per her choice) to allow time for nipples to heal and MOB stated she would like to do this.  Will return at 1000, when infant's next feeding is due, to show MOB on how to operate hospital grade double electric breast pump and Primary RN, Mandie, will provide MOB with Similac formula and bottles.    MOB encouraged to apply colostrum to breasts and allow to air dry and then apply Lanolin cream as instructed afterwards to help promote healing.    MOB has WIC and is seen at the office in [place], [place].  MOB encouraged to call WIC about obtaining a hospital grade breast pump from them for home use.  Will have Primary RN, Mandie, order a manual breast pump for MOB to use until she can secure a breast pump from [place].    Breastfeeding Plan:  1.  Supplement infant with formula and/or expressed breast milk per the 10-20-30 supplemental guidelines.  2.  Pump as instructed to protect milk supply.    MOB verbalized understanding of all information provided to her and denied having any further questions at this time.  Encouraged MOB to call for lactation assistance as needed.  "

## 2019-09-13 NOTE — CARE PLAN
Problem: Safety  Goal: Will remain free from injury  Outcome: PROGRESSING SLOWER THAN EXPECTED  Note:   Treaded socks in place, bed in the lowest position, call light and belongings within reach, pt call for assistance appropriately      Problem: Pain Management  Goal: Pain level will decrease to patient's comfort goal  Outcome: PROGRESSING SLOWER THAN EXPECTED  Note:   Medicated with ibuprofen per MAR with adequate pain control, hourly rounding in progress     Problem: Altered physiologic condition related to immediate post-delivery state and potential for bleeding/hemorrhage  Goal: Patient physiologically stable as evidenced by normal lochia, palpable uterine involution and vital signs within normal limits  Outcome: PROGRESSING SLOWER THAN EXPECTED  Note:   VSS, fundus firm, lochia light

## 2019-09-13 NOTE — PROGRESS NOTES
Assumed care of patient, report from LINDA Lockwood.  Patient assessment complete.  Patient re-educated on infant safe sleep policy and infant feeding frequency, states understanding.  Plan of care discussed, all questions answered at this time, will continue to monitor.

## 2019-09-13 NOTE — DISCHARGE INSTRUCTIONS
POSTPARTUM DISCHARGE INSTRUCTIONS FOR MOM    Follow up: .6 weeks Alberto   Pelvic Rest x 6 weeks    YOB: 1995   Age: 24 y.o.               Admit Date: 9/11/2019     Discharge Date: 9/13/2019  Attending Doctor:  Corinne E Capurro, M.D.                  Allergies:  Banana    Discharged to home by car. Discharged via wheelchair, hospital escort: Yes.  Special equipment needed: Not Applicable  Belongings with: Personal  Be sure to schedule a follow-up appointment with your primary care doctor or any specialists as instructed.     Discharge Plan:   Influenza Vaccine Indication: Indicated: 9 to 64 years of age  Influenza Vaccine Given - only chart on this line when given: Influenza Vaccine Given (See MAR)    REASONS TO CALL YOUR OBSTETRICIAN:  1.   Persistent fever or shaking chills (Temperature higher than 100.4)  2.   Heavy bleeding (soaking more than 1 pad per hour); Passing clots  3.   Foul odor from vagina  4.   Mastitis (Breast infection; breast pain, chills, fever, redness)  5.   Urinary pain, burning or frequency  6.   Episiotomy infection  7.   Abdominal incision infection  8.   Severe depression longer than 24 hours    HAND WASHING  · Prior to handling the baby.  · Before breastfeeding or bottle feeding baby.  · After using the bathroom or changing the baby's diaper.      VAGINAL CARE  · Nothing inside vagina for 6 weeks: no sexual intercourse, tampons or douching.  · Bleeding may continue for 2-4 weeks.  Amount may vary.    · Call your physician for heavy bleeding which means soaking more than 1 pad per hour    BIRTH CONTROL  · It is possible to become pregnant at any time after delivery and while breastfeeding.  · Plan to discuss a method of birth control with your physician at your follow up visit. visit.    DIET AND ELIMINATION  · Eating more fiber (bran cereal, fruits, and vegetables) and drinking plenty of fluids will help to avoid constipation.  · Urinary frequency after childbirth is  "normal.    POSTPARTUM BLUES  During the first few days after birth, you may experience a sense of the \"blues\" which may include impatience, irritability or even crying.  These feeling come and go quickly.  However, as many as 1 in 10 women experience emotional symptoms known as postpartum depression.    Postpartum depression:  May start as early as the second or third day after delivery or take several weeks or months to develop.  Symptoms of \"blues\" are present, but are more intense:  Crying spells; loss of appetite; feelings of hopelessness or loss of control; fear of touching the baby; over concern or no concern at all about the baby; little or no concern about your own appearance/caring for yourself; and/or inability to sleep or excessive sleeping.  Contact your physician if you are experiencing any of these symptoms.    Crisis Hotline:  · [place] Crisis Hotline:  2-627-TATRQLU  Or 1-572.400.9483  · [place] Crisis Hotline:  1-321.474.3612  Or 381-925-2874    PREVENTING SHAKEN BABY:  If you are angry or stressed, PUT THE BABY IN THE CRIB, step away, take some deep breaths, and wait until you are calm to care for the baby.  DO NOT SHAKE THE BABY.  You are not alone, call a supporter for help.    · Crisis Call Center 24/7 crisis line 243-042-6377 or 1-925.906.3452  · You can also text them, text \"ANSWER\" to 176570    QUIT SMOKING/TOBACCO USE:  I understand the use of any tobacco products increases my chance of suffering from future heart disease and could cause other illnesses which may shorten my life. Quitting the use of tobacco products is the single most important thing I can do to improve my health. For further information on smoking / tobacco cessation call a Toll Free Quit Line at 1-922.309.5741 (*National [employer] [place]) or 1-848.494.9697 (American Lung Association) or you can access the web based program at www.lungusa.org.    · [place] Tobacco Users Help Line:  (423) 962-5848       Toll Free: " 5-600-339-3932  · Quit Tobacco Program [facility] (553)630-0755    DEPRESSION / SUICIDE RISK:  As you are discharged from this [facility], it is important to learn how to keep safe from harming yourself.    Recognize the warning signs:  · Abrupt changes in personality, positive or negative- including increase in energy   · Giving away possessions  · Change in eating patterns- significant weight changes-  positive or negative  · Change in sleeping patterns- unable to sleep or sleeping all the time   · Unwillingness or inability to communicate  · Depression  · Unusual sadness, discouragement and loneliness  · Talk of wanting to die  · Neglect of personal appearance   · Rebelliousness- reckless behavior  · Withdrawal from people/activities they love  · Confusion- inability to concentrate     If you or a loved one observes any of these behaviors or has concerns about self-harm, here's what you can do:  · Talk about it- your feelings and reasons for harming yourself  · Remove any means that you might use to hurt yourself (examples: pills, rope, extension cords, firearm)  · Get professional help from the community (Mental Health, Substance Abuse, psychological counseling)  · Do not be alone:Call your Safe Contact- someone whom you trust who will be there for you.  · Call your local CRISIS HOTLINE 857-9631 or 831-843-9071  · Call your local Children's Mobile Crisis Response Team [place] (821) 008-9836 or www.VaxInnate  · Call the toll free National Suicide Prevention Hotlines   · National Suicide Prevention Lifeline 689-115-ZXXN (9861)  · National Hope Line Network 800-SUICIDE (211-5985)    DISCHARGE SURVEY:  Thank you for choosing [facility].  We hope we provided you with very good care.  You may be receiving a survey in the mail.  Please fill it out.  Your opinion is valuable to us.    ADDITIONAL EDUCATIONAL MATERIALS GIVEN TO PATIENT:        My signature on this form  indicates that:  1.  I have reviewed and understand the above information  2.  My questions regarding this information have been answered to my satisfaction.  3.  I have formulated a plan with my discharge nurse to obtain my prescribed medication for home.

## 2019-09-13 NOTE — CARE PLAN
Problem: Safety  Goal: Will remain free from injury  9/13/2019 0839 by Machelle C. Delos Reyes, R.N.  Outcome: PROGRESSING AS EXPECTED  Note:   Treaded socks in place, bed in the lowest position, call light and belongings within reach, pt call for assistance appropriately   9/12/2019 1848 by Machelle C. Delos Reyes, R.N.  Outcome: PROGRESSING SLOWER THAN EXPECTED  Note:   Treaded socks in place, bed in the lowest position, call light and belongings within reach, pt call for assistance appropriately      Problem: Altered physiologic condition related to immediate post-delivery state and potential for bleeding/hemorrhage  Goal: Patient physiologically stable as evidenced by normal lochia, palpable uterine involution and vital signs within normal limits  9/13/2019 0839 by Machelle C. Delos Reyes, R.MARE.  Outcome: PROGRESSING AS EXPECTED  Note:   VSS, fundus firm, lochia light  9/12/2019 1848 by Machelle C. Delos Reyes, R.N.  Outcome: PROGRESSING SLOWER THAN EXPECTED  Note:   VSS, fundus firm, lochia light

## 2019-09-13 NOTE — PROGRESS NOTES
Report received. Assumed care. Pt in bed awake. A/O x4. VSS. Responds appropriately. Denies pain, SOB. Assessment complete. Fundus firm, lochia light. Discussed POC, pain control, mobility,  care, skin to skin, breastfeeding, safety, DC planning , pt verbalizes understanding. Call light and belongings within reach.  Bed in the lowest position. Treaded socks in place. Hourly rounding in progress. Will continue to monitor .

## 2019-09-14 VITALS
HEIGHT: 64 IN | SYSTOLIC BLOOD PRESSURE: 117 MMHG | DIASTOLIC BLOOD PRESSURE: 70 MMHG | TEMPERATURE: 97 F | WEIGHT: 245 LBS | HEART RATE: 57 BPM | RESPIRATION RATE: 18 BRPM | BODY MASS INDEX: 41.83 KG/M2 | OXYGEN SATURATION: 95 %

## 2019-09-14 PROCEDURE — 700102 HCHG RX REV CODE 250 W/ 637 OVERRIDE(OP): Performed by: OBSTETRICS & GYNECOLOGY

## 2019-09-14 PROCEDURE — A9270 NON-COVERED ITEM OR SERVICE: HCPCS | Performed by: OBSTETRICS & GYNECOLOGY

## 2019-09-14 RX ADMIN — IBUPROFEN 600 MG: 600 TABLET ORAL at 06:23

## 2019-09-14 RX ADMIN — IBUPROFEN 600 MG: 600 TABLET ORAL at 00:19

## 2019-09-14 NOTE — LACTATION NOTE
Written and verbal instruction provided to MOB and FOB on how to operate hospital grade double electric breast pump and how to clean pump parts.  Flange fit of 25 mm assessed and found to be too small per visualization and as stated by MOB.  Increased flange size to 30.5 mm and MOB reported increased comfort with fit of flange at each breast.    Instruction on pump settings reviewed with MOB and are as follows: 80 CPM down to 60 after 2 minutes/suction set to comfort at 22%/pumps for 15 minutes.    Primary RN, Mandie, reported infant was fed approximately 45-50 ml of Similac formula at the last feeding.  Reviewed 10-20-30 supplementation guidelines with parents of infant and both verbalized understanding.  Parents cautioned that overfeeding infant could cause infant to become colicky and uncomfortable.  If infant appears to want to suck for comfort after feedings, MOB encouraged to provide infant with a pacifier to use until she can put infant to the breast again.  Discussed appropriate pacifier use with parents of infant.    Breastfeeding plan remains unchanged.

## 2019-09-14 NOTE — PROGRESS NOTES
Assumed care. Assessment complete. VSS. Fundus firm, lochia light. Pt ambulating and voiding without difficulty. Pt would like pain medication as needed. Call light within reach. Will continue to monitor.

## 2019-09-14 NOTE — DISCHARGE SUMMARY
DATE OF ADMISSION:  09/11/2019.    DATE OF DISCHARGE:  09/13/2019.    ADMITTING DIAGNOSIS:  Pregnancy at term for induction of labor secondary to   gestational hypertension.    DISCHARGE DIAGNOSES:  1.  Pregnancy at term for induction of labor secondary to gestational   hypertension.  2.  Status post normal spontaneous vaginal delivery.    HOSPITAL COURSE IN DETAIL:  This patient was admitted on the aforementioned   date with the aforementioned diagnoses.  She was started on Pitocin.  AROM   clear and received an epidural for pain control, progressed over normal labor   curve, eventually delivering a female infant without complication.  Patient   and baby recovered in stable condition.  On postpartum day #1, she is doing   well without complaint, tolerating a regular diet with minimal lochia.  She   desired discharge home, but the baby had to stay an additional day.  Today,   postpartum day #2, she desires discharge home.  She is afebrile.  Her vital   signs are within normal limits.  Her abdomen is soft with full fundus below   the umbilicus.    ASSESSMENT:  At this time is postpartum day #2, status post normal spontaneous   vaginal delivery at term for gestational hypertension, currently   normotensive, doing well, desires discharge home.    PLAN:  At this time:  1.  Discharge to home.  2.  Follow up in 6 weeks.  3.  Pelvic rest.  4.  Lift precautions.  5.  Scripts for Motrin written.       ____________________________________     MD JAGDEEP Bell / RADHA    DD:  09/14/2019 06:37:14  DT:  09/14/2019 07:31:32    D#:  [id number]  Job#:  [id number]

## 2019-09-14 NOTE — CARE PLAN
Problem: Pain  Goal: Alleviation of Pain or a reduction in pain to the patient's comfort goal  Outcome: PROGRESSING AS EXPECTED  Note:   Patient reports mild to moderate pain, reports relief with PRN medications, will continue to monitor.      Problem: Altered physiologic condition related to immediate post-delivery state and potential for bleeding/hemorrhage  Goal: Patient physiologically stable as evidenced by normal lochia, palpable uterine involution and vital signs within normal limits  Outcome: PROGRESSING AS EXPECTED  Note:   VSS, fundus firm, light lochia, will continue to monitor.

## 2019-09-14 NOTE — PROGRESS NOTES
Hospital Day : 3    S: doing well; tom diet; min bleed; bfeed    O:  Vitals:    19 1800 19 0600 19 1800 19 0600   BP: 101/55 121/69 118/71 117/70   Pulse: 69 72 79 (!) 57   Resp:    Temp: 36.8 °C (98.2 °F) 36.4 °C (97.5 °F) 36.2 °C (97.1 °F) 36.1 °C (97 °F)   TempSrc: Temporal Temporal Temporal Temporal   SpO2: 91% 94% 98% 95%   Weight:       Height:           Recent Labs     19  1255 19  1926   WBC 7.9 16.0*   RBC 4.31 4.08*   HEMOGLOBIN 13.8 12.9   HEMATOCRIT 40.1 38.1   MCV 93.0 93.4   MCH 32.0 31.6   MCHC 34.4 33.9   RDW 45.6 46.6   PLATELETCT 186 163*   MPV 12.3 11.8     Recent Labs     19  1255   SODIUM 136   POTASSIUM 3.7   CHLORIDE 104   CO2 21   GLUCOSE 110*   BUN 9   CREATININE 0.56   CALCIUM 9.3         abd soft ff    A: pp2 sp  doing well    P: dc

## 2020-05-13 ENCOUNTER — HOSPITAL ENCOUNTER (OUTPATIENT)
Dept: LAB | Facility: MEDICAL CENTER | Age: 25
End: 2020-05-13
Attending: OBSTETRICS & GYNECOLOGY
Payer: COMMERCIAL

## 2020-05-13 LAB
ABO GROUP BLD: NORMAL
BASOPHILS # BLD AUTO: 0.1 % (ref 0–1.8)
BASOPHILS # BLD: 0.01 K/UL (ref 0–0.12)
BLD GP AB SCN SERPL QL: NORMAL
EOSINOPHIL # BLD AUTO: 0.05 K/UL (ref 0–0.51)
EOSINOPHIL NFR BLD: 0.6 % (ref 0–6.9)
ERYTHROCYTE [DISTWIDTH] IN BLOOD BY AUTOMATED COUNT: 43.5 FL (ref 35.9–50)
HBV SURFACE AG SER QL: NORMAL
HCT VFR BLD AUTO: 40.3 % (ref 37–47)
HGB BLD-MCNC: 13.6 G/DL (ref 12–16)
HIV 1+2 AB+HIV1 P24 AG SERPL QL IA: NORMAL
IMM GRANULOCYTES # BLD AUTO: 0.04 K/UL (ref 0–0.11)
IMM GRANULOCYTES NFR BLD AUTO: 0.5 % (ref 0–0.9)
LYMPHOCYTES # BLD AUTO: 1.93 K/UL (ref 1–4.8)
LYMPHOCYTES NFR BLD: 21.9 % (ref 22–41)
MCH RBC QN AUTO: 30.6 PG (ref 27–33)
MCHC RBC AUTO-ENTMCNC: 33.7 G/DL (ref 33.6–35)
MCV RBC AUTO: 90.8 FL (ref 81.4–97.8)
MONOCYTES # BLD AUTO: 0.46 K/UL (ref 0–0.85)
MONOCYTES NFR BLD AUTO: 5.2 % (ref 0–13.4)
NEUTROPHILS # BLD AUTO: 6.32 K/UL (ref 2–7.15)
NEUTROPHILS NFR BLD: 71.7 % (ref 44–72)
NRBC # BLD AUTO: 0 K/UL
NRBC BLD-RTO: 0 /100 WBC
PLATELET # BLD AUTO: 228 K/UL (ref 164–446)
PMV BLD AUTO: 10.9 FL (ref 9–12.9)
RBC # BLD AUTO: 4.44 M/UL (ref 4.2–5.4)
RH BLD: NORMAL
RUBV AB SER QL: 83.6 IU/ML
TREPONEMA PALLIDUM IGG+IGM AB [PRESENCE] IN SERUM OR PLASMA BY IMMUNOASSAY: NORMAL
WBC # BLD AUTO: 8.8 K/UL (ref 4.8–10.8)

## 2020-05-13 PROCEDURE — 87340 HEPATITIS B SURFACE AG IA: CPT

## 2020-05-13 PROCEDURE — 86850 RBC ANTIBODY SCREEN: CPT

## 2020-05-13 PROCEDURE — 86901 BLOOD TYPING SEROLOGIC RH(D): CPT

## 2020-05-13 PROCEDURE — 86780 TREPONEMA PALLIDUM: CPT

## 2020-05-13 PROCEDURE — 86900 BLOOD TYPING SEROLOGIC ABO: CPT

## 2020-05-13 PROCEDURE — 85025 COMPLETE CBC W/AUTO DIFF WBC: CPT

## 2020-05-13 PROCEDURE — 86762 RUBELLA ANTIBODY: CPT

## 2020-05-13 PROCEDURE — 87086 URINE CULTURE/COLONY COUNT: CPT

## 2020-05-13 PROCEDURE — 87389 HIV-1 AG W/HIV-1&-2 AB AG IA: CPT

## 2020-05-16 LAB
BACTERIA UR CULT: NORMAL
SIGNIFICANT IND 70042: NORMAL
SITE SITE: NORMAL
SOURCE SOURCE: NORMAL

## 2020-07-10 ENCOUNTER — HOSPITAL ENCOUNTER (OUTPATIENT)
Dept: LAB | Facility: MEDICAL CENTER | Age: 25
End: 2020-07-10
Attending: OBSTETRICS & GYNECOLOGY
Payer: COMMERCIAL

## 2020-07-10 PROCEDURE — 81511 FTL CGEN ABNOR FOUR ANAL: CPT

## 2020-07-14 LAB
# FETUSES US: NORMAL
AFP MOM SERPL: 1.08
AFP SERPL-MCNC: 30 NG/ML
AGE - REPORTED: 23.3 YR
CURRENT SMOKER: NO
FAMILY MEMBER DISEASES HX: NO
GA METHOD: NORMAL
GA: NORMAL WK
HCG MOM SERPL: 0.6
HCG SERPL-ACNC: NORMAL IU/L
HX OF HEREDITARY DISORDERS: NO
IDDM PATIENT QL: NO
INHIBIN A MOM SERPL: 0.57
INHIBIN A SERPL-MCNC: 82 PG/ML
INTEGRATED SCN PATIENT-IMP: NORMAL
PATHOLOGY STUDY: NORMAL
SPECIMEN DRAWN SERPL: NORMAL
U ESTRIOL MOM SERPL: 1.62
U ESTRIOL SERPL-MCNC: 1.67 NG/ML

## 2020-09-11 ENCOUNTER — HOSPITAL ENCOUNTER (OUTPATIENT)
Dept: LAB | Facility: MEDICAL CENTER | Age: 25
End: 2020-09-11
Attending: PHYSICIAN ASSISTANT
Payer: COMMERCIAL

## 2020-09-11 LAB — COVID ORDER STATUS COVID19: NORMAL

## 2020-09-11 PROCEDURE — C9803 HOPD COVID-19 SPEC COLLECT: HCPCS

## 2020-09-11 PROCEDURE — U0003 INFECTIOUS AGENT DETECTION BY NUCLEIC ACID (DNA OR RNA); SEVERE ACUTE RESPIRATORY SYNDROME CORONAVIRUS 2 (SARS-COV-2) (CORONAVIRUS DISEASE [COVID-19]), AMPLIFIED PROBE TECHNIQUE, MAKING USE OF HIGH THROUGHPUT TECHNOLOGIES AS DESCRIBED BY CMS-2020-01-R: HCPCS

## 2020-09-12 LAB
SARS-COV-2 RNA RESP QL NAA+PROBE: NOTDETECTED
SPECIMEN SOURCE: NORMAL

## 2020-09-18 ENCOUNTER — HOSPITAL ENCOUNTER (OUTPATIENT)
Facility: MEDICAL CENTER | Age: 25
End: 2020-09-18
Attending: OBSTETRICS & GYNECOLOGY
Payer: COMMERCIAL

## 2020-09-18 LAB — TREPONEMA PALLIDUM IGG+IGM AB [PRESENCE] IN SERUM OR PLASMA BY IMMUNOASSAY: NORMAL

## 2020-09-18 PROCEDURE — 86780 TREPONEMA PALLIDUM: CPT

## 2020-09-18 PROCEDURE — 82950 GLUCOSE TEST: CPT

## 2020-09-18 PROCEDURE — 85025 COMPLETE CBC W/AUTO DIFF WBC: CPT

## 2020-09-19 LAB
BASOPHILS # BLD AUTO: 0.2 % (ref 0–1.8)
BASOPHILS # BLD: 0.02 K/UL (ref 0–0.12)
EOSINOPHIL # BLD AUTO: 0.06 K/UL (ref 0–0.51)
EOSINOPHIL NFR BLD: 0.6 % (ref 0–6.9)
ERYTHROCYTE [DISTWIDTH] IN BLOOD BY AUTOMATED COUNT: 49.2 FL (ref 35.9–50)
GLUCOSE 1H P 50 G GLC PO SERPL-MCNC: 134 MG/DL (ref 70–139)
HCT VFR BLD AUTO: 38.7 % (ref 37–47)
HGB BLD-MCNC: 12.9 G/DL (ref 12–16)
IMM GRANULOCYTES # BLD AUTO: 0.07 K/UL (ref 0–0.11)
IMM GRANULOCYTES NFR BLD AUTO: 0.7 % (ref 0–0.9)
LYMPHOCYTES # BLD AUTO: 1.76 K/UL (ref 1–4.8)
LYMPHOCYTES NFR BLD: 18.1 % (ref 22–41)
MCH RBC QN AUTO: 32.3 PG (ref 27–33)
MCHC RBC AUTO-ENTMCNC: 33.3 G/DL (ref 33.6–35)
MCV RBC AUTO: 96.8 FL (ref 81.4–97.8)
MONOCYTES # BLD AUTO: 0.4 K/UL (ref 0–0.85)
MONOCYTES NFR BLD AUTO: 4.1 % (ref 0–13.4)
NEUTROPHILS # BLD AUTO: 7.4 K/UL (ref 2–7.15)
NEUTROPHILS NFR BLD: 76.3 % (ref 44–72)
NRBC # BLD AUTO: 0 K/UL
NRBC BLD-RTO: 0 /100 WBC
PLATELET # BLD AUTO: 188 K/UL (ref 164–446)
PMV BLD AUTO: 11.2 FL (ref 9–12.9)
RBC # BLD AUTO: 4 M/UL (ref 4.2–5.4)
WBC # BLD AUTO: 9.7 K/UL (ref 4.8–10.8)

## 2020-11-20 LAB — STREP GP B DNA PCR: POSITIVE

## 2020-11-24 LAB
GP B STREP DNA SPEC QL NAA+PROBE: POSITIVE
STREP GP B DNA PCR: NEGATIVE
STREP GP B DNA PCR: POSITIVE

## 2020-12-12 ENCOUNTER — HOSPITAL ENCOUNTER (INPATIENT)
Facility: MEDICAL CENTER | Age: 25
LOS: 2 days | End: 2020-12-14
Attending: OBSTETRICS & GYNECOLOGY | Admitting: OBSTETRICS & GYNECOLOGY
Payer: COMMERCIAL

## 2020-12-12 LAB
BASOPHILS # BLD AUTO: 0.2 % (ref 0–1.8)
BASOPHILS # BLD: 0.03 K/UL (ref 0–0.12)
COVID ORDER STATUS COVID19: NORMAL
EOSINOPHIL # BLD AUTO: 0.07 K/UL (ref 0–0.51)
EOSINOPHIL NFR BLD: 0.5 % (ref 0–6.9)
ERYTHROCYTE [DISTWIDTH] IN BLOOD BY AUTOMATED COUNT: 44.2 FL (ref 35.9–50)
HCT VFR BLD AUTO: 42.2 % (ref 37–47)
HGB BLD-MCNC: 14.6 G/DL (ref 12–16)
HOLDING TUBE BB 8507: NORMAL
IMM GRANULOCYTES # BLD AUTO: 0.09 K/UL (ref 0–0.11)
IMM GRANULOCYTES NFR BLD AUTO: 0.6 % (ref 0–0.9)
LYMPHOCYTES # BLD AUTO: 2.85 K/UL (ref 1–4.8)
LYMPHOCYTES NFR BLD: 19.2 % (ref 22–41)
MCH RBC QN AUTO: 32.3 PG (ref 27–33)
MCHC RBC AUTO-ENTMCNC: 34.6 G/DL (ref 33.6–35)
MCV RBC AUTO: 93.4 FL (ref 81.4–97.8)
MONOCYTES # BLD AUTO: 0.91 K/UL (ref 0–0.85)
MONOCYTES NFR BLD AUTO: 6.1 % (ref 0–13.4)
NEUTROPHILS # BLD AUTO: 10.88 K/UL (ref 2–7.15)
NEUTROPHILS NFR BLD: 73.4 % (ref 44–72)
NRBC # BLD AUTO: 0 K/UL
NRBC BLD-RTO: 0 /100 WBC
PLATELET # BLD AUTO: 190 K/UL (ref 164–446)
PMV BLD AUTO: 11 FL (ref 9–12.9)
RBC # BLD AUTO: 4.52 M/UL (ref 4.2–5.4)
SARS-COV+SARS-COV-2 AG RESP QL IA.RAPID: NOTDETECTED
SPECIMEN SOURCE: NORMAL
WBC # BLD AUTO: 14.8 K/UL (ref 4.8–10.8)

## 2020-12-12 PROCEDURE — 36415 COLL VENOUS BLD VENIPUNCTURE: CPT

## 2020-12-12 PROCEDURE — 59409 OBSTETRICAL CARE: CPT

## 2020-12-12 PROCEDURE — 304965 HCHG RECOVERY SERVICES

## 2020-12-12 PROCEDURE — 85025 COMPLETE CBC W/AUTO DIFF WBC: CPT

## 2020-12-12 PROCEDURE — 87426 SARSCOV CORONAVIRUS AG IA: CPT

## 2020-12-12 PROCEDURE — 770002 HCHG ROOM/CARE - OB PRIVATE (112)

## 2020-12-12 PROCEDURE — 700111 HCHG RX REV CODE 636 W/ 250 OVERRIDE (IP)

## 2020-12-12 PROCEDURE — C9803 HOPD COVID-19 SPEC COLLECT: HCPCS | Performed by: OBSTETRICS & GYNECOLOGY

## 2020-12-12 RX ORDER — OXYTOCIN 10 [USP'U]/ML
INJECTION, SOLUTION INTRAMUSCULAR; INTRAVENOUS
Status: COMPLETED
Start: 2020-12-12 | End: 2020-12-12

## 2020-12-12 RX ORDER — ACETAMINOPHEN 325 MG/1
325 TABLET ORAL EVERY 4 HOURS PRN
Status: DISCONTINUED | OUTPATIENT
Start: 2020-12-12 | End: 2020-12-14 | Stop reason: HOSPADM

## 2020-12-12 RX ORDER — ONDANSETRON 2 MG/ML
4 INJECTION INTRAMUSCULAR; INTRAVENOUS EVERY 6 HOURS PRN
Status: DISCONTINUED | OUTPATIENT
Start: 2020-12-12 | End: 2020-12-14 | Stop reason: HOSPADM

## 2020-12-12 RX ORDER — ALUMINA, MAGNESIA, AND SIMETHICONE 2400; 2400; 240 MG/30ML; MG/30ML; MG/30ML
30 SUSPENSION ORAL EVERY 6 HOURS PRN
Status: DISCONTINUED | OUTPATIENT
Start: 2020-12-12 | End: 2020-12-12 | Stop reason: HOSPADM

## 2020-12-12 RX ORDER — OXYCODONE AND ACETAMINOPHEN 10; 325 MG/1; MG/1
1 TABLET ORAL EVERY 4 HOURS PRN
Status: DISCONTINUED | OUTPATIENT
Start: 2020-12-12 | End: 2020-12-14 | Stop reason: HOSPADM

## 2020-12-12 RX ORDER — ONDANSETRON 4 MG/1
4 TABLET, ORALLY DISINTEGRATING ORAL EVERY 6 HOURS PRN
Status: DISCONTINUED | OUTPATIENT
Start: 2020-12-12 | End: 2020-12-14 | Stop reason: HOSPADM

## 2020-12-12 RX ORDER — IBUPROFEN 600 MG/1
600 TABLET ORAL EVERY 6 HOURS PRN
Status: DISCONTINUED | OUTPATIENT
Start: 2020-12-12 | End: 2020-12-14 | Stop reason: HOSPADM

## 2020-12-12 RX ORDER — SODIUM CHLORIDE, SODIUM LACTATE, POTASSIUM CHLORIDE, CALCIUM CHLORIDE 600; 310; 30; 20 MG/100ML; MG/100ML; MG/100ML; MG/100ML
INJECTION, SOLUTION INTRAVENOUS CONTINUOUS
Status: ACTIVE | OUTPATIENT
Start: 2020-12-12 | End: 2020-12-13

## 2020-12-12 RX ORDER — OXYCODONE HYDROCHLORIDE AND ACETAMINOPHEN 5; 325 MG/1; MG/1
1 TABLET ORAL EVERY 4 HOURS PRN
Status: DISCONTINUED | OUTPATIENT
Start: 2020-12-12 | End: 2020-12-14 | Stop reason: HOSPADM

## 2020-12-12 RX ORDER — MISOPROSTOL 200 UG/1
800 TABLET ORAL
Status: DISCONTINUED | OUTPATIENT
Start: 2020-12-12 | End: 2020-12-12 | Stop reason: HOSPADM

## 2020-12-12 RX ADMIN — OXYTOCIN 20 UNITS: 10 INJECTION, SOLUTION INTRAMUSCULAR; INTRAVENOUS at 21:23

## 2020-12-12 RX ADMIN — OXYTOCIN 10 UNITS: 10 INJECTION, SOLUTION INTRAMUSCULAR; INTRAVENOUS at 21:12

## 2020-12-12 ASSESSMENT — FIBROSIS 4 INDEX: FIB4 SCORE: 0.58

## 2020-12-12 ASSESSMENT — LIFESTYLE VARIABLES: EVER_SMOKED: NEVER

## 2020-12-12 ASSESSMENT — PAIN DESCRIPTION - PAIN TYPE: TYPE: ACUTE PAIN

## 2020-12-13 LAB
ERYTHROCYTE [DISTWIDTH] IN BLOOD BY AUTOMATED COUNT: 45.4 FL (ref 35.9–50)
HCT VFR BLD AUTO: 35.7 % (ref 37–47)
HGB BLD-MCNC: 12.5 G/DL (ref 12–16)
MCH RBC QN AUTO: 33.2 PG (ref 27–33)
MCHC RBC AUTO-ENTMCNC: 35 G/DL (ref 33.6–35)
MCV RBC AUTO: 94.7 FL (ref 81.4–97.8)
PLATELET # BLD AUTO: 171 K/UL (ref 164–446)
PMV BLD AUTO: 11.1 FL (ref 9–12.9)
RBC # BLD AUTO: 3.77 M/UL (ref 4.2–5.4)
WBC # BLD AUTO: 15.3 K/UL (ref 4.8–10.8)

## 2020-12-13 PROCEDURE — 770002 HCHG ROOM/CARE - OB PRIVATE (112)

## 2020-12-13 PROCEDURE — 85027 COMPLETE CBC AUTOMATED: CPT

## 2020-12-13 PROCEDURE — 36415 COLL VENOUS BLD VENIPUNCTURE: CPT

## 2020-12-13 PROCEDURE — 700102 HCHG RX REV CODE 250 W/ 637 OVERRIDE(OP): Performed by: OBSTETRICS & GYNECOLOGY

## 2020-12-13 PROCEDURE — A9270 NON-COVERED ITEM OR SERVICE: HCPCS | Performed by: OBSTETRICS & GYNECOLOGY

## 2020-12-13 RX ORDER — VITAMIN A ACETATE, BETA CAROTENE, ASCORBIC ACID, CHOLECALCIFEROL, .ALPHA.-TOCOPHEROL ACETATE, DL-, THIAMINE MONONITRATE, RIBOFLAVIN, NIACINAMIDE, PYRIDOXINE HYDROCHLORIDE, FOLIC ACID, CYANOCOBALAMIN, CALCIUM CARBONATE, FERROUS FUMARATE, ZINC OXIDE, CUPRIC OXIDE 3080; 12; 120; 400; 1; 1.84; 3; 20; 22; 920; 25; 200; 27; 10; 2 [IU]/1; UG/1; MG/1; [IU]/1; MG/1; MG/1; MG/1; MG/1; MG/1; [IU]/1; MG/1; MG/1; MG/1; MG/1; MG/1
1 TABLET, FILM COATED ORAL EVERY MORNING
Status: DISCONTINUED | OUTPATIENT
Start: 2020-12-13 | End: 2020-12-14 | Stop reason: HOSPADM

## 2020-12-13 RX ORDER — DOCUSATE SODIUM 100 MG/1
100 CAPSULE, LIQUID FILLED ORAL 2 TIMES DAILY
Status: DISCONTINUED | OUTPATIENT
Start: 2020-12-13 | End: 2020-12-14 | Stop reason: HOSPADM

## 2020-12-13 RX ORDER — MISOPROSTOL 200 UG/1
600 TABLET ORAL
Status: DISCONTINUED | OUTPATIENT
Start: 2020-12-13 | End: 2020-12-14 | Stop reason: HOSPADM

## 2020-12-13 RX ORDER — SODIUM CHLORIDE, SODIUM LACTATE, POTASSIUM CHLORIDE, CALCIUM CHLORIDE 600; 310; 30; 20 MG/100ML; MG/100ML; MG/100ML; MG/100ML
INJECTION, SOLUTION INTRAVENOUS PRN
Status: DISCONTINUED | OUTPATIENT
Start: 2020-12-13 | End: 2020-12-14 | Stop reason: HOSPADM

## 2020-12-13 RX ADMIN — IBUPROFEN 600 MG: 600 TABLET, FILM COATED ORAL at 15:49

## 2020-12-13 RX ADMIN — DOCUSATE SODIUM 100 MG: 100 CAPSULE ORAL at 08:17

## 2020-12-13 RX ADMIN — DOCUSATE SODIUM 100 MG: 100 CAPSULE ORAL at 17:31

## 2020-12-13 RX ADMIN — PRENATAL WITH FERROUS FUM AND FOLIC ACID 1 TABLET: 3080; 920; 120; 400; 22; 1.84; 3; 20; 10; 1; 12; 200; 27; 25; 2 TABLET ORAL at 08:17

## 2020-12-13 RX ADMIN — IBUPROFEN 600 MG: 600 TABLET, FILM COATED ORAL at 01:40

## 2020-12-13 ASSESSMENT — PAIN DESCRIPTION - PAIN TYPE
TYPE: ACUTE PAIN

## 2020-12-13 NOTE — PROGRESS NOTES
S: Pt doing well, lochia small, pain controlled, voiding, passing gas, baby doing well, breast feeding    O:   Vitals:    20 0600   BP: 111/59   Pulse: 69   Resp: 19   Temp: 36.7 °C (98 °F)   SpO2: 96%     Gen - NAD, comfortable  Abd - soft, nontender, nondistended, no rebound or guarding  Fundus - firm, nontender, below umbilicus  Ext - nontender, no edema    Labs: Hgb 14.6 pre-delivery -> 12.5 post    A: PPD#1 s/p precipitous  at 38+6wks - doing well postpartum, stable  Breast feeding well  GBS positive status - did not receive any antibiotics    P: Continue routine postpartum care, d/c home on PPD#2 due to GBS pos w/ no ABx

## 2020-12-13 NOTE — L&D DELIVERY NOTE
Vaginal Delivery Procedure Note:    Alley Lea is a 25 y.o. , now Para      Weeks of gestation: 38w6d  Diagnosis: Active labor/Stage 2, SROM, GBS positive    Patient presented to L&D in active labor with ruptured membranes. On cervical exam she was found to be completely dilated. Patient pushed and had a rapid vaginal delivery.   of viable female infant over intact perineum at . Vertex, OA. Nose and mouth suctioned with bulb suction. Infant placed on maternal abdomen. Delayed cord clamp performed. Cord then clamped and was cut by FOB.   APGARs 8 and 9  Birth weight - pending  Nuchal cord - none present  Placenta delivered intact in [facility] with fundal massage and gentle traction at . 3 Vessel cord.  Laceration: none present.  Excellent hemostasis.   mL  Anesthesia - none  Sponge and needle counts correct.  Patient tolerated procedure well. Mother and baby bonding skin to skin.

## 2020-12-13 NOTE — DISCHARGE PLANNING
"Discharge Planning Assessment Post Partum     Reason for Referral: [facility] in February    Address: [address] [address]  Type of Living Situation: [place] with FOB, FOB's parents and 1 year old son also by FOB.   Mom Diagnosis: Pregnancy   Baby Diagnosis: Madill  Primary Language: English      Name of Baby: Ann-Marie Yancey    Mother of the Baby: Alley Lea ([phone])  Father of the Baby: Rafat Cordero        Involved in baby’s care? Yes   Contact Information: [phone]     Prenatal Care: Yes   Mom's PCP: None  PCP for new baby: Dr. Leiva      Support System: Yes  Coping/Bonding between mother & baby: Yes  Source of Feeding: Breast   Supplies for Infant: Prepared     Mom's Insurance: PEBP/Healthscope     Baby Covered on Insurance: MOB's insurance   Mother Employed/School: Yes, both MOB and FOB work   Other children in the home/names & ages: Yes, 1 year old son also by FOB.      Financial Hardship/Income: Denies  Mom's Mental status: Alert and Oriented x 4  Services used prior to admit: Denies     CPS History: Denies  Psychiatric History: Denies.  LSW explained the difference between PPD and baby blues and encouraged MOB to reach out if she is experiencing any heightened anxiety or depression.   Domestic Violence History: Denies  Drug/ETOH History: Denies any abuse with marijuana, but did report she would use \"occasionally,\"  MOB went on to report her last use was in Feb and did not become pregnant until March.  MOB denies any use during pregnancy and reported she will not use while breast feeding.         Resources Provided: Postpartum,   Referrals Made: None       Clearance for Discharge: Pending UDS on baby.  If negative, MOB and baby are cleared to discharge upon medical clearance.  If positive,  will need clearance from DCFS.          Ongoing Plan: Continue to provide support and resources to family until dc  "

## 2020-12-13 NOTE — DISCHARGE SUMMARY
Obstetrics Discharge Summary    Admission Date: 2020         Discharge Date: 2020    ADMISSION DIAGNOSIS:  1. Term intrauterine pregnancy at 38+6 weeks  2. Spontaneous rupture of membranes  3. GBS positive     DISCHARGE DIAGNOSIS:  1. Term intrauterine pregnancy at 38+6 weeks  2. Spontaneous rupture of membranes  3. GBS positive     DETAILS OF HOSPITAL STAY  Presenting Problem/History of Present Illness: Spontaneous rupture of membranes     Hospital Course:  Patient is a 25 y.o. , who presented to [facility] at 38w6d with a chief complaint of SROM. She had prenatal care with [facility] with Dr. Corrine Dominguez. Pregnancy was complicated by: n/a. For full details of the delivery please refer to the delivery note dictation. Briefly, the patient underwent an uncomplicated normal spontaneous vaginal delivery. There were no complications. Estimated blood loss was 250 ml. The patient delivered a viable female infant weighing 8lbs 11.3oz with Apgars as below in delivery summary. Patient’s recovery and postpartum course were unremarkable. By postpartum day #2 patient met all appropriate milestones and was stable to be discharged to home.    APGARs:   8   9      COMPLICATIONS: none    PHYSICAL EXAM:  Vitals:   Vitals:    20 0500   BP: 101/55   Pulse: 65   Resp: 18   Temp: 36.4 °C (97.6 °F)   SpO2: 96%   General: Alert, conversational, pleasant, no acute distress  CVS: Regular rate  PULM: No respiratory distress, symmetric expansion  ABD: Soft, non-tender, non-distended, fundus firm, non-tender, below the umbilicus  : Deferred  Extremities: Moves all, trace edema     LABS/STUDIES:   Results for DE LA ROSA ROQUEPRITI FUENTES (MRN [id number]) as of 2020 09:03   Ref. Range 2020 21:30 2020 05:45   WBC Latest Ref Range: 4.8 - 10.8 K/uL 14.8 (H) 15.3 (H)   RBC Latest Ref Range: 4.20 - 5.40 M/uL 4.52 3.77 (L)   Hemoglobin Latest Ref Range: 12.0 - 16.0 g/dL 14.6 12.5    Hematocrit Latest Ref Range: 37.0 - 47.0 % 42.2 35.7 (L)   MCV Latest Ref Range: 81.4 - 97.8 fL 93.4 94.7   MCH Latest Ref Range: 27.0 - 33.0 pg 32.3 33.2 (H)   MCHC Latest Ref Range: 33.6 - 35.0 g/dL 34.6 35.0   RDW Latest Ref Range: 35.9 - 50.0 fL 44.2 45.4   Platelet Count Latest Ref Range: 164 - 446 K/uL 190 171   MPV Latest Ref Range: 9.0 - 12.9 fL 11.0 11.1     DISPOSITION: Home.    DISCHARGE MEDICATIONS:  - Ibuprofen 800 mg every 8 hours as needed for pain.    DISCHARGE INSTRUCTIONS:  1. Pelvic rest for 6 weeks postpartum.   2. Postpartum visit in 6 weeks at [facility] [phone].  3. Return to the emergency department if experiencing increased vaginal bleeding, severe pain, temperature greater than 100.4, or any other concerns.    DISCHARGE CONDITION: Stable.    Kenia Sandhu M.D.    3

## 2020-12-13 NOTE — LACTATION NOTE
This note was copied from a baby's chart.  Mother reports she feels that her infant latches only to the tip of the nipple and her nipples are sore. Nipples intact on assessment, reviewed hand expression, large drops of colostrum removed easily. Reviewed positioning and latch in football hold, including chin first asymmetrical latch and infant easily and eagerly attached and sustained nutritive feeding patterns with consistent audible swallows. Mother reports nipple comfort with feeding, taught to identify swallows. Plan is cue based breastfeeding at least 8 or more times per 24 hours. Denies questions/concerns.

## 2020-12-13 NOTE — H&P
"Labor and Delivery History and Physical    CC: Contractions, water broke    HPI: 26yo  at 38+6 weeks, EDC 20. Patient presents to L&D with complaint of painful contractions worsening through the day and water broke (clear) around 1930 tonight. Feels the urge to push. Baby moving well.    All other systems were reviewed and were negative.    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, medical assistant  FH: cancer  Meds: PNV  Allergies: NKDA    /49   Pulse 71   Temp 36.8 °C (98.3 °F) (Temporal)   Ht 1.6 m (5' 3\")   Wt 105.2 kg (232 lb)   BMI 41.10 kg/m²     Physical Exam  Gen: in moderate distress due to painful contractions  Neck: supple  Resp: normal effort, no distress  Abd: soft, no rebound or guarding, gravid, non tender  Ext: no edema, non tender    FHT:135  [place]: every 1-2 minutes  SVE: 10/100/+2    Laboratory Evaluation  ABO: O+/-  H/H: 13.6/40.3  GBS positive per patient  GTT: 134  Rubella: Immune  HIV: Neg  RPR: NR  HepBsAg: neg  Pap: NILM    Assessment:   26yo  at 38w6d  Active labor/stage 2  SROM  GBS positive    Plan:  Admit to L&D  Patient is ready to push  Anticipate vaginal delivery very soon        Kiley Hathaway M.D.      "

## 2020-12-13 NOTE — PROGRESS NOTES
2120 Report received from Tammi MCKEON. Patient delivered.    2245 Patient ambulated to bathroom with a steady gait, patient voided.    2305 Patient ambulated to , rm 302. Report given to Evelyn MCKEON. POC discussed.

## 2020-12-13 NOTE — PROGRESS NOTES
2100 Pt arrives on unit complaining of u/c's, LOF, and urge to push. Pt assisted into labor bed   SVE 9/100/0 pushing with u/c's.   Dr. Hathaway notified of pt arrival and SVE   Dr. Hathaway to bedside, SVE 10/100/+2 pushing    viable female   IM pitocin given   Placenta delivered   Report to ROLLY Moreland RN

## 2020-12-14 ENCOUNTER — PHARMACY VISIT (OUTPATIENT)
Dept: PHARMACY | Facility: MEDICAL CENTER | Age: 25
End: 2020-12-14
Payer: COMMERCIAL

## 2020-12-14 VITALS
SYSTOLIC BLOOD PRESSURE: 101 MMHG | WEIGHT: 232 LBS | DIASTOLIC BLOOD PRESSURE: 55 MMHG | BODY MASS INDEX: 41.11 KG/M2 | HEIGHT: 63 IN | HEART RATE: 65 BPM | TEMPERATURE: 97.6 F | RESPIRATION RATE: 18 BRPM | OXYGEN SATURATION: 96 %

## 2020-12-14 PROCEDURE — RXMED WILLOW AMBULATORY MEDICATION CHARGE: Performed by: OBSTETRICS & GYNECOLOGY

## 2020-12-14 PROCEDURE — A9270 NON-COVERED ITEM OR SERVICE: HCPCS | Performed by: OBSTETRICS & GYNECOLOGY

## 2020-12-14 PROCEDURE — 700102 HCHG RX REV CODE 250 W/ 637 OVERRIDE(OP): Performed by: OBSTETRICS & GYNECOLOGY

## 2020-12-14 RX ORDER — IBUPROFEN 800 MG/1
800 TABLET ORAL EVERY 8 HOURS PRN
Qty: 21 TAB | Refills: 0 | Status: SHIPPED | OUTPATIENT
Start: 2020-12-14

## 2020-12-14 RX ADMIN — IBUPROFEN 600 MG: 600 TABLET, FILM COATED ORAL at 07:07

## 2020-12-14 ASSESSMENT — EDINBURGH POSTNATAL DEPRESSION SCALE (EPDS)
I HAVE FELT SAD OR MISERABLE: NO, NOT AT ALL
I HAVE BLAMED MYSELF UNNECESSARILY WHEN THINGS WENT WRONG: NOT VERY OFTEN
I HAVE LOOKED FORWARD WITH ENJOYMENT TO THINGS: AS MUCH AS I EVER DID
I HAVE BEEN SO UNHAPPY THAT I HAVE HAD DIFFICULTY SLEEPING: NOT AT ALL
I HAVE BEEN ANXIOUS OR WORRIED FOR NO GOOD REASON: NO, NOT AT ALL
I HAVE BEEN ABLE TO LAUGH AND SEE THE FUNNY SIDE OF THINGS: AS MUCH AS I ALWAYS COULD
I HAVE BEEN SO UNHAPPY THAT I HAVE BEEN CRYING: ONLY OCCASIONALLY
I HAVE FELT SCARED OR PANICKY FOR NO GOOD REASON: NO, NOT AT ALL
THINGS HAVE BEEN GETTING ON TOP OF ME: NO, I HAVE BEEN COPING AS WELL AS EVER
THE THOUGHT OF HARMING MYSELF HAS OCCURRED TO ME: NEVER

## 2020-12-14 ASSESSMENT — PAIN DESCRIPTION - PAIN TYPE
TYPE: ACUTE PAIN
TYPE: ACUTE PAIN

## 2020-12-14 NOTE — DISCHARGE PLANNING
Meds-to-Beds: Discharge prescription order listed below delivered to patient's bedside. RN notified. Patient counseled.  Patient elected to have co-payment billed to patient account.      Alley Rosa   Home Medication Instructions CHRISTIAN:[id number]    Printed on:12/14/20 1020   Medication Information                      ibuprofen (MOTRIN) 800 MG Tab  Take 1 Tab by mouth every 8 hours as needed (For cramping after delivery)               Nancy Ward, PharmD

## 2020-12-14 NOTE — PROGRESS NOTES
Patient teachings/ Discharge instruction discussed. Emphasized the importance of  screening follow-up test. Questions and concerns have been answered.

## 2020-12-14 NOTE — PROGRESS NOTES
Late entry: Babita RN brought pt from labor and delivery. ID bands and cuddles checked and verified with labor and delivery nurse,Babita. Patient oriented to room and surroundings. Skylight discussed. Bulb syring demonstration. Educated on emergency call light. ID bands and security issues discussed. Educated about the pink photo ID name badges. Educated about not sleeping with infant, no carrying infant in halls, and no leaving infant unattended. Assessment completed on patient. Discussed pain management. IV without redness and swelling. FOB at bedside.Encourage pt to call for any needs.

## 2020-12-14 NOTE — LACTATION NOTE
This note was copied from a baby's chart.  Assisted with latch and worked on techniques. Mother able to achieve latch independently and reports more comfort. Reviewed breast compression to help milk flow for baby to keep actively drinking at breast, let down noted and infant gulping at breast after breast compression. Offered assistance with latch on the right breast, mother declines and wants to rest that side, until nipple damage is healed.      Plan is to breastfeed on left, with cues. Mother to pump right breast to establish her supply. Mother to feedback any expressed milk to infant. If needed, may give formula if parents desire.     Supplemental and milk storage guidelines given and reviewed. Plans to rent HG pump.    No other questions or concerns. Encouraged to call for additional support as needed.

## 2020-12-14 NOTE — DISCHARGE INSTRUCTIONS
POSTPARTUM DISCHARGE INSTRUCTIONS FOR MOM    YOB: 1995   Age: 25 y.o.               Admit Date: 2020     Discharge Date: 2020  Attending Doctor:  Corinne E Capurro, M.D.                  Allergies:  Banana    Discharged to home by car. Discharged via wheelchair, hospital escort: Yes.  Special equipment needed: Not Applicable  Belongings with: Personal  Be sure to schedule a follow-up appointment with your primary care doctor or any specialists as instructed.     Discharge Plan:   Diet Plan: Discussed  Activity Level: Discussed  Confirmed Follow up Appointment: Patient to Call and Schedule Appointment  Confirmed Symptoms Management: Discussed  Medication Reconciliation Updated: Yes  Influenza Vaccine Indication: Not indicated: Previously immunized this influenza season and > 8 years of age(per pt already vaccinated; refusing at this time.)    REASONS TO CALL YOUR OBSTETRICIAN:  1.   Persistent fever or shaking chills (Temperature higher than 100.4)  2.   Heavy bleeding (soaking more than 1 pad per hour); Passing clots  3.   Foul odor from vagina  4.   Mastitis (Breast infection; breast pain, chills, fever, redness)  5.   Urinary pain, burning or frequency  6.   Episiotomy infection  7.   Abdominal incision infection  8.   Severe depression longer than 24 hours    HAND WASHING  · Prior to handling the baby.  · Before breastfeeding or bottle feeding baby.  · After using the bathroom or changing the baby's diaper.    WOUND CARE  Ask your physician for additional care instructions.  In general:    ·  Incision:      · Keep clean and dry.    · Do NOT lift anything heavier than your baby for up to 6 weeks.    · There should not be any opening or pus.      VAGINAL CARE  · Nothing inside vagina for 6 weeks: no sexual intercourse, tampons or douching.  · Bleeding may continue for 2-4 weeks.  Amount may vary.    · Call your physician for heavy bleeding which means soaking more than 1 pad per  "hour    BIRTH CONTROL  · It is possible to become pregnant at any time after delivery and while breastfeeding.  · Plan to discuss a method of birth control with your physician at your follow up visit. visit.    DIET AND ELIMINATION  · Eating more fiber (bran cereal, fruits, and vegetables) and drinking plenty of fluids will help to avoid constipation.  · Urinary frequency after childbirth is normal.    POSTPARTUM BLUES  During the first few days after birth, you may experience a sense of the \"blues\" which may include impatience, irritability or even crying.  These feeling come and go quickly.  However, as many as 1 in 10 women experience emotional symptoms known as postpartum depression.    Postpartum depression:  May start as early as the second or third day after delivery or take several weeks or months to develop.  Symptoms of \"blues\" are present, but are more intense:  Crying spells; loss of appetite; feelings of hopelessness or loss of control; fear of touching the baby; over concern or no concern at all about the baby; little or no concern about your own appearance/caring for yourself; and/or inability to sleep or excessive sleeping.  Contact your physician if you are experiencing any of these symptoms.    Crisis Hotline:  · [place] Crisis Hotline:  4-367-HJIAKDT  Or 1-977.638.2082  · [place] Crisis Hotline:  1-853.817.5454  Or 321-647-7115    PREVENTING SHAKEN BABY:  If you are angry or stressed, PUT THE BABY IN THE CRIB, step away, take some deep breaths, and wait until you are calm to care for the baby.  DO NOT SHAKE THE BABY.  You are not alone, call a supporter for help.    · Crisis Call Center 24/7 crisis line 329-823-7913 or 1-603.912.5564  · You can also text them, text \"ANSWER\" to 205120    QUIT SMOKING/TOBACCO USE:  I understand the use of any tobacco products increases my chance of suffering from future heart disease and could cause other illnesses which may shorten my life. Quitting the use of " tobacco products is the single most important thing I can do to improve my health. For further information on smoking / tobacco cessation call a Toll Free Quit Line at 1-574.197.3452 (*[employer]) or 1-576.731.5378 (American Lung Association) or you can access the web based program at www.lungusa.org.    · [place] Tobacco Users Help Line:  (957) 651-3083       Toll Free: 1-450.328.5777  · Quit Tobacco Program [facility] (864)737-1253    DEPRESSION / SUICIDE RISK:  As you are discharged from this [facility] [place], it is important to learn how to keep safe from harming yourself.    Recognize the warning signs:  · Abrupt changes in personality, positive or negative- including increase in energy   · Giving away possessions  · Change in eating patterns- significant weight changes-  positive or negative  · Change in sleeping patterns- unable to sleep or sleeping all the time   · Unwillingness or inability to communicate  · Depression  · Unusual sadness, discouragement and loneliness  · Talk of wanting to die  · Neglect of personal appearance   · Rebelliousness- reckless behavior  · Withdrawal from people/activities they love  · Confusion- inability to concentrate     If you or a loved one observes any of these behaviors or has concerns about self-harm, here's what you can do:  · Talk about it- your feelings and reasons for harming yourself  · Remove any means that you might use to hurt yourself (examples: pills, rope, extension cords, firearm)  · Get professional help from the community (Mental Health, Substance Abuse, psychological counseling)  · Do not be alone:Call your Safe Contact- someone whom you trust who will be there for you.  · Call your local CRISIS HOTLINE 196-2484 or 877-423-2609  · Call your local Children's Mobile Crisis Response Team [place] (542) 902-7271 or www.[employer]  · Call the toll free National Suicide Prevention Hotlines   · National  Suicide Prevention Lifeline 394-978-SIOH (2749)  · [facility] 800-SUICIDE (235-0685)    DISCHARGE SURVEY:  Thank you for choosing [facility].  We hope we provided you with very good care.  You may be receiving a survey in the mail.  Please fill it out.  Your opinion is valuable to us.    ADDITIONAL EDUCATIONAL MATERIALS GIVEN TO PATIENT:    1. Pelvic rest for 6 weeks postpartum.   2. [facility] at [facility] [phone].  3. Return to the emergency department if experiencing increased vaginal bleeding, severe pain, temperature greater than 100.4, or any other concerns.       My signature on this form indicates that:  1.  I have reviewed and understand the above information  2.  My questions regarding this information have been answered to my satisfaction.  3.  I have formulated a plan with my discharge nurse to obtain my prescribed medication for home.

## 2020-12-14 NOTE — CARE PLAN
Problem: Altered physiologic condition related to immediate post-delivery state and potential for bleeding/hemorrhage  Goal: Patient physiologically stable as evidenced by normal lochia, palpable uterine involution and vital signs within normal limits  Outcome: PROGRESSING AS EXPECTED  Note: Fundus firm, lochia light. Vitals WDL.      Problem: Alteration in comfort related to episiotomy, vaginal repair and/or after birth pains  Goal: Patient is able to ambulate, care for self and infant  Outcome: PROGRESSING AS EXPECTED  Note: Patient able to care for self and infant appropriately.

## 2020-12-14 NOTE — LACTATION NOTE
This note was copied from a baby's chart.  Follow up visit. Mother reports that breastfeeding was going well, but developed soreness and nipple damage, and fed some formula overnight. Set up mother on pump as she wants to provide her own breastmilk. 80 CPMs to start, after 2 minutes decrease to 60 CPMs, suction set to her comfort, may increase or decrease suction as needed. Mother reports she used larger flanges when she pumped with her first, 28.5 mm flanges provided. Appears to be appropriate fit.  Encouraged to call for latch assistance for next feeding, and to teach parents how to pace bottle feed.     Provided mother outpatient lactation information, and invited to zoom meetings. Mother states she has lactation resources in [place], and she plans to follow up there. Pump rental information provided, as mother does not currently have a pump. Encouraged her to contact her insurance provider to discuss how to obtain her personal pump.

## 2020-12-15 NOTE — PROGRESS NOTES
Pt discharged at approximately 1713 via wheelchair with hospital escort. Infant placed in car seat by parent, and checked by RN.  Checked armbands. Clamp and cuddles removed. No further questions at this time.

## 2021-06-30 ENCOUNTER — HOSPITAL ENCOUNTER (OUTPATIENT)
Facility: MEDICAL CENTER | Age: 26
End: 2021-06-30
Attending: STUDENT IN AN ORGANIZED HEALTH CARE EDUCATION/TRAINING PROGRAM
Payer: COMMERCIAL

## 2021-06-30 ENCOUNTER — OFFICE VISIT (OUTPATIENT)
Dept: URGENT CARE | Facility: CLINIC | Age: 26
End: 2021-06-30
Payer: COMMERCIAL

## 2021-06-30 VITALS
HEIGHT: 63 IN | BODY MASS INDEX: 34.96 KG/M2 | HEART RATE: 78 BPM | WEIGHT: 197.3 LBS | RESPIRATION RATE: 18 BRPM | DIASTOLIC BLOOD PRESSURE: 58 MMHG | SYSTOLIC BLOOD PRESSURE: 110 MMHG | OXYGEN SATURATION: 100 % | TEMPERATURE: 97.9 F

## 2021-06-30 DIAGNOSIS — K52.9 GASTROENTERITIS: ICD-10-CM

## 2021-06-30 DIAGNOSIS — R11.2 NON-INTRACTABLE VOMITING WITH NAUSEA, UNSPECIFIED VOMITING TYPE: ICD-10-CM

## 2021-06-30 DIAGNOSIS — R10.33 PERIUMBILICAL ABDOMINAL PAIN: ICD-10-CM

## 2021-06-30 PROCEDURE — 99204 OFFICE O/P NEW MOD 45 MIN: CPT | Performed by: STUDENT IN AN ORGANIZED HEALTH CARE EDUCATION/TRAINING PROGRAM

## 2021-06-30 PROCEDURE — U0005 INFEC AGEN DETEC AMPLI PROBE: HCPCS

## 2021-06-30 PROCEDURE — U0003 INFECTIOUS AGENT DETECTION BY NUCLEIC ACID (DNA OR RNA); SEVERE ACUTE RESPIRATORY SYNDROME CORONAVIRUS 2 (SARS-COV-2) (CORONAVIRUS DISEASE [COVID-19]), AMPLIFIED PROBE TECHNIQUE, MAKING USE OF HIGH THROUGHPUT TECHNOLOGIES AS DESCRIBED BY CMS-2020-01-R: HCPCS

## 2021-06-30 RX ORDER — ONDANSETRON 4 MG/1
4 TABLET, ORALLY DISINTEGRATING ORAL EVERY 6 HOURS PRN
Qty: 20 TABLET | Refills: 0 | Status: SHIPPED | OUTPATIENT
Start: 2021-06-30

## 2021-06-30 RX ORDER — DICYCLOMINE HCL 20 MG
TABLET ORAL
Qty: 20 TABLET | Refills: 0 | Status: SHIPPED | OUTPATIENT
Start: 2021-06-30

## 2021-06-30 ASSESSMENT — FIBROSIS 4 INDEX: FIB4 SCORE: 0.64

## 2021-06-30 NOTE — PROGRESS NOTES
"Subjective:   CHIEF COMPLAINT  Chief Complaint   Patient presents with   • Emesis      x today; diarrhea, lower abdomen pain,light headed        HPI  Alley Lea is a 25 y.o. female who presents with a chief complaint of nausea, vomiting and abdominal pain.  She had 1 bout of diarrhea yesterday and one bout today.  Says she has vomited 6 times today.  She points to the periumbilical region as a source of discomfort and says at times she does experience back pain.  She denies hearing any dysuria or hematuria.  No specific triggers.  No alleviating factors.  The patient has not tried any medications.  She debnies any dysuria or hematuria.  No history of abdominal surgeries.    REVIEW OF SYSTEMS  General: no fever or chills  GI: admits nausea and vomiting  See HPI for further details.    PAST MEDICAL HISTORY  There are no problems to display for this patient.      SURGICAL HISTORY  patient denies any surgical history    ALLERGIES  Allergies   Allergen Reactions   • Banana Anaphylaxis and Itching       CURRENT MEDICATIONS  Home Medications     Reviewed by Jaxon Nuñez Ass't (Medical Assistant) on 06/30/21 at 1131  Med List Status: <None>   Medication Last Dose Status   ibuprofen (MOTRIN) 800 MG Tab Not Taking Active                SOCIAL HISTORY  Social History     Tobacco Use   • Smoking status: Never Smoker   • Smokeless tobacco: Never Used   Vaping Use   • Vaping Use: Never used   Substance and Sexual Activity   • Alcohol use: Yes   • Drug use: Yes     Types: Inhaled     Comment: THC   • Sexual activity: Not on file       FAMILY HISTORY  No family history on file.       Objective:   PHYSICAL EXAM  VITAL SIGNS: /58 (BP Location: Left arm, Patient Position: Sitting)   Pulse 78   Temp 36.6 °C (97.9 °F) (Temporal)   Resp 18   Ht 1.6 m (5' 3\")   Wt 89.5 kg (197 lb 4.8 oz)   SpO2 100%   BMI 34.95 kg/m²     Gen: no acute distress, normal voice  Skin: dry, intact, moist mucosal " membranes  Lungs: CTAB w/ symmetric expansion  CV: RRR w/o murmurs or clicks  Abdomen: Bowel sounds normal, soft, no tenderness, rebound or guarding.   Psych: normal affect, normal judgement, alert, awake    Assessment/Plan:     1. Gastroenteritis  ondansetron (ZOFRAN ODT) 4 MG TABLET DISPERSIBLE    COVID/SARS CoV-2 PCR    dicyclomine (BENTYL) 20 MG Tab   2. Non-intractable vomiting with nausea, unspecified vomiting type  ondansetron (ZOFRAN ODT) 4 MG TABLET DISPERSIBLE    COVID/SARS CoV-2 PCR    dicyclomine (BENTYL) 20 MG Tab   3. Periumbilical abdominal pain     Signs and symptoms consistent with a viral gastroenteritis.  Exam was reassuring.  No red flags.  Discussed symptomatic treatment and return precautions.  -Ordered Bentyl.  Patient says that she is not breast-feeding  -Ordered Zofran  -Encouraged fluid hydration with 50-50 mix of water and Gatorade and advance to solids as tolerated  -Ordered Covid.  Results will be sent through [employer].  -Quarantine until Covid results have returned  -Discussed at length red flags and ED precautions.  The patient verbalized understanding.  All questions were answered.    Differential diagnosis, natural history, supportive care, and indications for immediate follow-up discussed. All questions answered. Patient agrees with the plan of care.    Follow-up as needed if symptoms worsen or fail to improve to PCP, Urgent care or Emergency Room.    Please note that this dictation was created using voice recognition software. I have made a reasonable attempt to correct obvious errors, but I expect that there are errors of grammar and possibly content that I did not discover before finalizing the note.

## 2021-07-01 DIAGNOSIS — K52.9 GASTROENTERITIS: ICD-10-CM

## 2021-07-01 DIAGNOSIS — R11.2 NON-INTRACTABLE VOMITING WITH NAUSEA, UNSPECIFIED VOMITING TYPE: ICD-10-CM

## 2021-07-01 LAB
COVID ORDER STATUS COVID19: NORMAL
SARS-COV-2 RNA RESP QL NAA+PROBE: NOTDETECTED
SPECIMEN SOURCE: NORMAL